# Patient Record
Sex: MALE | Race: WHITE | Employment: UNEMPLOYED | URBAN - METROPOLITAN AREA
[De-identification: names, ages, dates, MRNs, and addresses within clinical notes are randomized per-mention and may not be internally consistent; named-entity substitution may affect disease eponyms.]

---

## 2023-04-30 ENCOUNTER — HOSPITAL ENCOUNTER (INPATIENT)
Age: 83
LOS: 4 days | Discharge: NURSING, REHAB OR LONG-TERM CARE | End: 2023-05-04
Attending: EMERGENCY MEDICINE | Admitting: INTERNAL MEDICINE
Payer: MEDICARE

## 2023-04-30 ENCOUNTER — APPOINTMENT (OUTPATIENT)
Dept: GENERAL RADIOLOGY | Age: 83
End: 2023-04-30
Payer: MEDICARE

## 2023-04-30 DIAGNOSIS — J96.01 ACUTE RESPIRATORY FAILURE WITH HYPOXIA (HCC): Primary | ICD-10-CM

## 2023-04-30 DIAGNOSIS — I50.9 ACUTE ON CHRONIC CONGESTIVE HEART FAILURE, UNSPECIFIED HEART FAILURE TYPE (HCC): ICD-10-CM

## 2023-04-30 PROBLEM — J02.9 PHARYNGITIS: Status: ACTIVE | Noted: 2023-04-30

## 2023-04-30 PROBLEM — C44.91 SKIN CANCER, BASAL CELL: Status: ACTIVE | Noted: 2023-04-30

## 2023-04-30 PROBLEM — R26.2 DIFFICULTY WALKING: Status: ACTIVE | Noted: 2023-04-30

## 2023-04-30 PROBLEM — I42.0 CONGESTIVE CARDIOMYOPATHY (HCC): Status: ACTIVE | Noted: 2023-04-30

## 2023-04-30 PROBLEM — E78.5 HYPERLIPIDEMIA: Status: ACTIVE | Noted: 2023-04-30

## 2023-04-30 PROBLEM — I34.0 MITRAL VALVE INSUFFICIENCY: Status: ACTIVE | Noted: 2023-04-30

## 2023-04-30 PROBLEM — I47.29 PAROXYSMAL VENTRICULAR TACHYCARDIA (HCC): Status: ACTIVE | Noted: 2023-04-30

## 2023-04-30 PROBLEM — I50.32 CHRONIC DIASTOLIC (CONGESTIVE) HEART FAILURE (HCC): Status: ACTIVE | Noted: 2023-04-30

## 2023-04-30 PROBLEM — I49.5 SINOATRIAL NODE DYSFUNCTION (HCC): Status: ACTIVE | Noted: 2023-04-30

## 2023-04-30 PROBLEM — E66.09 NON MORBID OBESITY DUE TO EXCESS CALORIES: Status: ACTIVE | Noted: 2023-04-30

## 2023-04-30 PROBLEM — R77.8 ELEVATED TROPONIN I LEVEL: Status: ACTIVE | Noted: 2023-04-30

## 2023-04-30 PROBLEM — I50.33 ACUTE ON CHRONIC DIASTOLIC (CONGESTIVE) HEART FAILURE (HCC): Status: ACTIVE | Noted: 2023-04-30

## 2023-04-30 PROBLEM — R05.8 NON-PRODUCTIVE COUGH: Status: ACTIVE | Noted: 2023-04-30

## 2023-04-30 PROBLEM — R42 VERTIGO: Status: ACTIVE | Noted: 2023-04-30

## 2023-04-30 PROBLEM — I45.9 HEART BLOC: Status: ACTIVE | Noted: 2023-04-30

## 2023-04-30 PROBLEM — Z95.810 AUTOMATIC IMPLANTABLE CARDIOVERTER-DEFIBRILLATOR IN SITU: Status: ACTIVE | Noted: 2023-04-30

## 2023-04-30 PROBLEM — I10 ESSENTIAL HYPERTENSION: Status: ACTIVE | Noted: 2023-04-30

## 2023-04-30 PROBLEM — R79.89 ELEVATED TROPONIN I LEVEL: Status: ACTIVE | Noted: 2023-04-30

## 2023-04-30 LAB
ALBUMIN SERPL-MCNC: 3.9 G/DL (ref 3.4–5)
ALBUMIN/GLOB SERPL: 1.1 {RATIO} (ref 1.1–2.2)
ALP SERPL-CCNC: 69 U/L (ref 40–129)
ALT SERPL-CCNC: 15 U/L (ref 10–40)
ANION GAP SERPL CALCULATED.3IONS-SCNC: 9 MMOL/L (ref 3–16)
AST SERPL-CCNC: 19 U/L (ref 15–37)
BASOPHILS # BLD: 0 K/UL (ref 0–0.2)
BASOPHILS NFR BLD: 0.2 %
BILIRUB SERPL-MCNC: 1.1 MG/DL (ref 0–1)
BUN SERPL-MCNC: 19 MG/DL (ref 7–20)
CALCIUM SERPL-MCNC: 9.7 MG/DL (ref 8.3–10.6)
CHLORIDE SERPL-SCNC: 100 MMOL/L (ref 99–110)
CO2 SERPL-SCNC: 27 MMOL/L (ref 21–32)
CREAT SERPL-MCNC: 1.1 MG/DL (ref 0.8–1.3)
DEPRECATED RDW RBC AUTO: 14.2 % (ref 12.4–15.4)
EOSINOPHIL # BLD: 0.1 K/UL (ref 0–0.6)
EOSINOPHIL NFR BLD: 0.7 %
GFR SERPLBLD CREATININE-BSD FMLA CKD-EPI: >60 ML/MIN/{1.73_M2}
GLUCOSE SERPL-MCNC: 110 MG/DL (ref 70–99)
HCT VFR BLD AUTO: 43 % (ref 40.5–52.5)
HGB BLD-MCNC: 14.1 G/DL (ref 13.5–17.5)
LACTATE BLDV-SCNC: 1.3 MMOL/L (ref 0.4–1.9)
LYMPHOCYTES # BLD: 1.2 K/UL (ref 1–5.1)
LYMPHOCYTES NFR BLD: 9.3 %
MCH RBC QN AUTO: 30.5 PG (ref 26–34)
MCHC RBC AUTO-ENTMCNC: 32.8 G/DL (ref 31–36)
MCV RBC AUTO: 92.8 FL (ref 80–100)
MONOCYTES # BLD: 1.4 K/UL (ref 0–1.3)
MONOCYTES NFR BLD: 11.2 %
NEUTROPHILS # BLD: 9.7 K/UL (ref 1.7–7.7)
NEUTROPHILS NFR BLD: 78.6 %
NT-PROBNP SERPL-MCNC: 828 PG/ML (ref 0–449)
PLATELET # BLD AUTO: 174 K/UL (ref 135–450)
PMV BLD AUTO: 8.7 FL (ref 5–10.5)
POTASSIUM SERPL-SCNC: 3.8 MMOL/L (ref 3.5–5.1)
PROCALCITONIN SERPL IA-MCNC: 0.64 NG/ML (ref 0–0.15)
PROT SERPL-MCNC: 7.3 G/DL (ref 6.4–8.2)
RBC # BLD AUTO: 4.63 M/UL (ref 4.2–5.9)
SODIUM SERPL-SCNC: 136 MMOL/L (ref 136–145)
TROPONIN, HIGH SENSITIVITY: 23 NG/L (ref 0–22)
TROPONIN, HIGH SENSITIVITY: 27 NG/L (ref 0–22)
WBC # BLD AUTO: 12.4 K/UL (ref 4–11)

## 2023-04-30 PROCEDURE — 83605 ASSAY OF LACTIC ACID: CPT

## 2023-04-30 PROCEDURE — 6370000000 HC RX 637 (ALT 250 FOR IP): Performed by: PHYSICIAN ASSISTANT

## 2023-04-30 PROCEDURE — 6370000000 HC RX 637 (ALT 250 FOR IP): Performed by: INTERNAL MEDICINE

## 2023-04-30 PROCEDURE — 2580000003 HC RX 258: Performed by: INTERNAL MEDICINE

## 2023-04-30 PROCEDURE — 99285 EMERGENCY DEPT VISIT HI MDM: CPT

## 2023-04-30 PROCEDURE — 6360000002 HC RX W HCPCS: Performed by: PHYSICIAN ASSISTANT

## 2023-04-30 PROCEDURE — 93005 ELECTROCARDIOGRAM TRACING: CPT | Performed by: PHYSICIAN ASSISTANT

## 2023-04-30 PROCEDURE — 96374 THER/PROPH/DIAG INJ IV PUSH: CPT

## 2023-04-30 PROCEDURE — 83880 ASSAY OF NATRIURETIC PEPTIDE: CPT

## 2023-04-30 PROCEDURE — 1200000000 HC SEMI PRIVATE

## 2023-04-30 PROCEDURE — 80053 COMPREHEN METABOLIC PANEL: CPT

## 2023-04-30 PROCEDURE — 71045 X-RAY EXAM CHEST 1 VIEW: CPT

## 2023-04-30 PROCEDURE — 85025 COMPLETE CBC W/AUTO DIFF WBC: CPT

## 2023-04-30 PROCEDURE — 36415 COLL VENOUS BLD VENIPUNCTURE: CPT

## 2023-04-30 PROCEDURE — 84145 PROCALCITONIN (PCT): CPT

## 2023-04-30 PROCEDURE — 84484 ASSAY OF TROPONIN QUANT: CPT

## 2023-04-30 RX ORDER — DORZOLAMIDE HYDROCHLORIDE AND TIMOLOL MALEATE 20; 5 MG/ML; MG/ML
1 SOLUTION/ DROPS OPHTHALMIC 2 TIMES DAILY
COMMUNITY

## 2023-04-30 RX ORDER — POLYETHYLENE GLYCOL 3350 17 G/17G
17 POWDER, FOR SOLUTION ORAL DAILY PRN
Status: DISCONTINUED | OUTPATIENT
Start: 2023-04-30 | End: 2023-05-04 | Stop reason: HOSPADM

## 2023-04-30 RX ORDER — SODIUM CHLORIDE 0.9 % (FLUSH) 0.9 %
10 SYRINGE (ML) INJECTION EVERY 12 HOURS SCHEDULED
Status: DISCONTINUED | OUTPATIENT
Start: 2023-04-30 | End: 2023-05-04 | Stop reason: HOSPADM

## 2023-04-30 RX ORDER — FUROSEMIDE 10 MG/ML
40 INJECTION INTRAMUSCULAR; INTRAVENOUS ONCE
Status: COMPLETED | OUTPATIENT
Start: 2023-04-30 | End: 2023-04-30

## 2023-04-30 RX ORDER — BRIMONIDINE TARTRATE 2 MG/ML
1 SOLUTION/ DROPS OPHTHALMIC DAILY
Status: DISCONTINUED | OUTPATIENT
Start: 2023-05-01 | End: 2023-05-01

## 2023-04-30 RX ORDER — FUROSEMIDE 10 MG/ML
40 INJECTION INTRAMUSCULAR; INTRAVENOUS 2 TIMES DAILY
Status: DISCONTINUED | OUTPATIENT
Start: 2023-05-01 | End: 2023-05-02

## 2023-04-30 RX ORDER — SODIUM CHLORIDE 0.9 % (FLUSH) 0.9 %
10 SYRINGE (ML) INJECTION PRN
Status: DISCONTINUED | OUTPATIENT
Start: 2023-04-30 | End: 2023-05-04 | Stop reason: HOSPADM

## 2023-04-30 RX ORDER — VALSARTAN 80 MG/1
160 TABLET ORAL DAILY
Status: DISCONTINUED | OUTPATIENT
Start: 2023-05-01 | End: 2023-05-01

## 2023-04-30 RX ORDER — ENOXAPARIN SODIUM 100 MG/ML
40 INJECTION SUBCUTANEOUS DAILY
Status: DISCONTINUED | OUTPATIENT
Start: 2023-05-01 | End: 2023-05-02

## 2023-04-30 RX ORDER — LATANOPROST 50 UG/ML
1 SOLUTION/ DROPS OPHTHALMIC NIGHTLY
Status: DISCONTINUED | OUTPATIENT
Start: 2023-05-01 | End: 2023-05-04 | Stop reason: HOSPADM

## 2023-04-30 RX ORDER — ONDANSETRON 2 MG/ML
4 INJECTION INTRAMUSCULAR; INTRAVENOUS EVERY 4 HOURS PRN
Status: DISCONTINUED | OUTPATIENT
Start: 2023-04-30 | End: 2023-05-04 | Stop reason: HOSPADM

## 2023-04-30 RX ORDER — LOSARTAN POTASSIUM 50 MG/1
50 TABLET ORAL DAILY
COMMUNITY

## 2023-04-30 RX ORDER — CARVEDILOL 6.25 MG/1
6.25 TABLET ORAL 2 TIMES DAILY WITH MEALS
Status: DISCONTINUED | OUTPATIENT
Start: 2023-05-01 | End: 2023-05-04 | Stop reason: HOSPADM

## 2023-04-30 RX ORDER — BENZONATATE 200 MG/1
200 CAPSULE ORAL 3 TIMES DAILY PRN
Status: DISCONTINUED | OUTPATIENT
Start: 2023-04-30 | End: 2023-05-04 | Stop reason: HOSPADM

## 2023-04-30 RX ORDER — ASPIRIN 81 MG/1
81 TABLET ORAL DAILY
COMMUNITY

## 2023-04-30 RX ORDER — ACETAMINOPHEN 325 MG/1
650 TABLET ORAL EVERY 4 HOURS PRN
Status: DISCONTINUED | OUTPATIENT
Start: 2023-04-30 | End: 2023-05-04 | Stop reason: HOSPADM

## 2023-04-30 RX ORDER — ACETAMINOPHEN 650 MG/1
650 SUPPOSITORY RECTAL EVERY 4 HOURS PRN
Status: DISCONTINUED | OUTPATIENT
Start: 2023-04-30 | End: 2023-05-04 | Stop reason: HOSPADM

## 2023-04-30 RX ORDER — MAGNESIUM HYDROXIDE/ALUMINUM HYDROXICE/SIMETHICONE 120; 1200; 1200 MG/30ML; MG/30ML; MG/30ML
30 SUSPENSION ORAL ONCE
Status: COMPLETED | OUTPATIENT
Start: 2023-04-30 | End: 2023-04-30

## 2023-04-30 RX ORDER — SODIUM CHLORIDE 9 MG/ML
INJECTION, SOLUTION INTRAVENOUS PRN
Status: DISCONTINUED | OUTPATIENT
Start: 2023-04-30 | End: 2023-05-04 | Stop reason: HOSPADM

## 2023-04-30 RX ADMIN — ALUMINUM HYDROXIDE, MAGNESIUM HYDROXIDE, AND SIMETHICONE 30 ML: 200; 200; 20 SUSPENSION ORAL at 18:58

## 2023-04-30 RX ADMIN — SODIUM CHLORIDE, PRESERVATIVE FREE 10 ML: 5 INJECTION INTRAVENOUS at 23:16

## 2023-04-30 RX ADMIN — FUROSEMIDE 40 MG: 10 INJECTION, SOLUTION INTRAMUSCULAR; INTRAVENOUS at 18:58

## 2023-04-30 RX ADMIN — BENZONATATE 200 MG: 100 CAPSULE ORAL at 23:51

## 2023-04-30 ASSESSMENT — PAIN SCALES - GENERAL
PAINLEVEL_OUTOF10: 5
PAINLEVEL_OUTOF10: 6

## 2023-04-30 ASSESSMENT — PAIN DESCRIPTION - LOCATION
LOCATION: ABDOMEN
LOCATION: THROAT

## 2023-04-30 ASSESSMENT — PAIN DESCRIPTION - DESCRIPTORS: DESCRIPTORS: BURNING

## 2023-04-30 ASSESSMENT — PAIN DESCRIPTION - PAIN TYPE: TYPE: ACUTE PAIN

## 2023-04-30 ASSESSMENT — PAIN DESCRIPTION - FREQUENCY: FREQUENCY: CONTINUOUS

## 2023-04-30 ASSESSMENT — PAIN - FUNCTIONAL ASSESSMENT
PAIN_FUNCTIONAL_ASSESSMENT: PREVENTS OR INTERFERES SOME ACTIVE ACTIVITIES AND ADLS
PAIN_FUNCTIONAL_ASSESSMENT: 0-10

## 2023-05-01 ENCOUNTER — APPOINTMENT (OUTPATIENT)
Dept: CT IMAGING | Age: 83
End: 2023-05-01
Payer: MEDICARE

## 2023-05-01 LAB
ANION GAP SERPL CALCULATED.3IONS-SCNC: 7 MMOL/L (ref 3–16)
BASE EXCESS BLDA CALC-SCNC: 2.8 MMOL/L (ref -3–3)
BASOPHILS # BLD: 0 K/UL (ref 0–0.2)
BASOPHILS NFR BLD: 0.2 %
BUN SERPL-MCNC: 22 MG/DL (ref 7–20)
CALCIUM SERPL-MCNC: 9.6 MG/DL (ref 8.3–10.6)
CHLORIDE SERPL-SCNC: 102 MMOL/L (ref 99–110)
CO2 BLDA-SCNC: 30.1 MMOL/L
CO2 SERPL-SCNC: 31 MMOL/L (ref 21–32)
COHGB MFR BLDA: 1.7 % (ref 0–1.5)
CREAT SERPL-MCNC: 1.2 MG/DL (ref 0.8–1.3)
DEPRECATED RDW RBC AUTO: 14.1 % (ref 12.4–15.4)
EKG ATRIAL RATE: 63 BPM
EKG DIAGNOSIS: NORMAL
EKG P AXIS: 108 DEGREES
EKG P-R INTERVAL: 158 MS
EKG Q-T INTERVAL: 448 MS
EKG QRS DURATION: 140 MS
EKG QTC CALCULATION (BAZETT): 458 MS
EKG R AXIS: 223 DEGREES
EKG T AXIS: 69 DEGREES
EKG VENTRICULAR RATE: 63 BPM
EOSINOPHIL # BLD: 0 K/UL (ref 0–0.6)
EOSINOPHIL NFR BLD: 0.2 %
GFR SERPLBLD CREATININE-BSD FMLA CKD-EPI: 59 ML/MIN/{1.73_M2}
GLUCOSE SERPL-MCNC: 149 MG/DL (ref 70–99)
HCO3 BLDA-SCNC: 28.7 MMOL/L (ref 21–29)
HCT VFR BLD AUTO: 42.5 % (ref 40.5–52.5)
HGB BLD-MCNC: 13.8 G/DL (ref 13.5–17.5)
HGB BLDA-MCNC: 14.5 G/DL (ref 13.5–17.5)
LV EF: 58 %
LVEF MODALITY: NORMAL
LYMPHOCYTES # BLD: 0.7 K/UL (ref 1–5.1)
LYMPHOCYTES NFR BLD: 6.7 %
MCH RBC QN AUTO: 30.2 PG (ref 26–34)
MCHC RBC AUTO-ENTMCNC: 32.6 G/DL (ref 31–36)
MCV RBC AUTO: 92.5 FL (ref 80–100)
METHGB MFR BLDA: 0.4 %
MONOCYTES # BLD: 1.3 K/UL (ref 0–1.3)
MONOCYTES NFR BLD: 11.8 %
NEUTROPHILS # BLD: 8.9 K/UL (ref 1.7–7.7)
NEUTROPHILS NFR BLD: 81.1 %
O2 THERAPY: ABNORMAL
PCO2 BLDA: 47.6 MMHG (ref 35–45)
PH BLDA: 7.39 [PH] (ref 7.35–7.45)
PLATELET # BLD AUTO: 170 K/UL (ref 135–450)
PMV BLD AUTO: 8.6 FL (ref 5–10.5)
PO2 BLDA: 70.7 MMHG (ref 75–108)
POTASSIUM SERPL-SCNC: 3.8 MMOL/L (ref 3.5–5.1)
RBC # BLD AUTO: 4.59 M/UL (ref 4.2–5.9)
SAO2 % BLDA: 95.8 %
SODIUM SERPL-SCNC: 140 MMOL/L (ref 136–145)
TROPONIN, HIGH SENSITIVITY: 28 NG/L (ref 0–22)
WBC # BLD AUTO: 11 K/UL (ref 4–11)

## 2023-05-01 PROCEDURE — 99223 1ST HOSP IP/OBS HIGH 75: CPT | Performed by: INTERNAL MEDICINE

## 2023-05-01 PROCEDURE — 82803 BLOOD GASES ANY COMBINATION: CPT

## 2023-05-01 PROCEDURE — 97530 THERAPEUTIC ACTIVITIES: CPT

## 2023-05-01 PROCEDURE — 6360000002 HC RX W HCPCS: Performed by: INTERNAL MEDICINE

## 2023-05-01 PROCEDURE — 84484 ASSAY OF TROPONIN QUANT: CPT

## 2023-05-01 PROCEDURE — 97162 PT EVAL MOD COMPLEX 30 MIN: CPT

## 2023-05-01 PROCEDURE — 2580000003 HC RX 258: Performed by: INTERNAL MEDICINE

## 2023-05-01 PROCEDURE — 6360000004 HC RX CONTRAST MEDICATION: Performed by: INTERNAL MEDICINE

## 2023-05-01 PROCEDURE — 92610 EVALUATE SWALLOWING FUNCTION: CPT

## 2023-05-01 PROCEDURE — 71260 CT THORAX DX C+: CPT

## 2023-05-01 PROCEDURE — 6370000000 HC RX 637 (ALT 250 FOR IP): Performed by: INTERNAL MEDICINE

## 2023-05-01 PROCEDURE — 97116 GAIT TRAINING THERAPY: CPT

## 2023-05-01 PROCEDURE — 80048 BASIC METABOLIC PNL TOTAL CA: CPT

## 2023-05-01 PROCEDURE — 97166 OT EVAL MOD COMPLEX 45 MIN: CPT

## 2023-05-01 PROCEDURE — 2700000000 HC OXYGEN THERAPY PER DAY

## 2023-05-01 PROCEDURE — 31720 CLEARANCE OF AIRWAYS: CPT

## 2023-05-01 PROCEDURE — C8929 TTE W OR WO FOL WCON,DOPPLER: HCPCS

## 2023-05-01 PROCEDURE — 94640 AIRWAY INHALATION TREATMENT: CPT

## 2023-05-01 PROCEDURE — 85025 COMPLETE CBC W/AUTO DIFF WBC: CPT

## 2023-05-01 PROCEDURE — 1200000000 HC SEMI PRIVATE

## 2023-05-01 PROCEDURE — 94761 N-INVAS EAR/PLS OXIMETRY MLT: CPT

## 2023-05-01 PROCEDURE — 36600 WITHDRAWAL OF ARTERIAL BLOOD: CPT

## 2023-05-01 PROCEDURE — 93010 ELECTROCARDIOGRAM REPORT: CPT | Performed by: INTERNAL MEDICINE

## 2023-05-01 RX ORDER — ALBUTEROL SULFATE 2.5 MG/3ML
2.5 SOLUTION RESPIRATORY (INHALATION) EVERY 4 HOURS PRN
Status: DISCONTINUED | OUTPATIENT
Start: 2023-05-01 | End: 2023-05-04 | Stop reason: HOSPADM

## 2023-05-01 RX ORDER — DORZOLAMIDE HYDROCHLORIDE AND TIMOLOL MALEATE 20; 5 MG/ML; MG/ML
1 SOLUTION/ DROPS OPHTHALMIC 2 TIMES DAILY
Status: DISCONTINUED | OUTPATIENT
Start: 2023-05-01 | End: 2023-05-04 | Stop reason: HOSPADM

## 2023-05-01 RX ORDER — MECLIZINE HCL 12.5 MG/1
12.5 TABLET ORAL 3 TIMES DAILY PRN
COMMUNITY

## 2023-05-01 RX ORDER — LOSARTAN POTASSIUM 50 MG/1
50 TABLET ORAL DAILY
Status: DISCONTINUED | OUTPATIENT
Start: 2023-05-01 | End: 2023-05-04 | Stop reason: HOSPADM

## 2023-05-01 RX ORDER — ASPIRIN 81 MG/1
81 TABLET ORAL DAILY
Status: DISCONTINUED | OUTPATIENT
Start: 2023-05-01 | End: 2023-05-04 | Stop reason: HOSPADM

## 2023-05-01 RX ORDER — SILDENAFIL CITRATE 20 MG/1
20 TABLET ORAL PRN
COMMUNITY

## 2023-05-01 RX ADMIN — FUROSEMIDE 40 MG: 10 INJECTION, SOLUTION INTRAMUSCULAR; INTRAVENOUS at 17:48

## 2023-05-01 RX ADMIN — DORZOLAMIDE HYDROCHLORIDE AND TIMOLOL MALEATE 1 DROP: 20; 5 SOLUTION/ DROPS OPHTHALMIC at 10:10

## 2023-05-01 RX ADMIN — PIPERACILLIN AND TAZOBACTAM 3375 MG: 3; .375 INJECTION, POWDER, LYOPHILIZED, FOR SOLUTION INTRAVENOUS at 17:48

## 2023-05-01 RX ADMIN — LATANOPROST 1 DROP: 50 SOLUTION OPHTHALMIC at 01:20

## 2023-05-01 RX ADMIN — PIPERACILLIN AND TAZOBACTAM 3375 MG: 3; .375 INJECTION, POWDER, LYOPHILIZED, FOR SOLUTION INTRAVENOUS at 10:07

## 2023-05-01 RX ADMIN — FUROSEMIDE 40 MG: 10 INJECTION, SOLUTION INTRAMUSCULAR; INTRAVENOUS at 10:02

## 2023-05-01 RX ADMIN — ALBUTEROL SULFATE 2.5 MG: 2.5 SOLUTION RESPIRATORY (INHALATION) at 09:42

## 2023-05-01 RX ADMIN — CARVEDILOL 6.25 MG: 6.25 TABLET, FILM COATED ORAL at 17:49

## 2023-05-01 RX ADMIN — LOSARTAN POTASSIUM 50 MG: 50 TABLET, FILM COATED ORAL at 10:09

## 2023-05-01 RX ADMIN — IOPAMIDOL 75 ML: 755 INJECTION, SOLUTION INTRAVENOUS at 13:09

## 2023-05-01 RX ADMIN — ASPIRIN 81 MG: 81 TABLET, COATED ORAL at 10:09

## 2023-05-01 RX ADMIN — Medication 1 LOZENGE: at 14:55

## 2023-05-01 RX ADMIN — DORZOLAMIDE HYDROCHLORIDE AND TIMOLOL MALEATE 1 DROP: 20; 5 SOLUTION/ DROPS OPHTHALMIC at 01:23

## 2023-05-01 RX ADMIN — SODIUM CHLORIDE, PRESERVATIVE FREE 10 ML: 5 INJECTION INTRAVENOUS at 10:11

## 2023-05-01 RX ADMIN — SERTRALINE 50 MG: 50 TABLET, FILM COATED ORAL at 10:09

## 2023-05-01 RX ADMIN — ENOXAPARIN SODIUM 40 MG: 100 INJECTION SUBCUTANEOUS at 10:10

## 2023-05-01 RX ADMIN — PERFLUTREN 1.5 ML: 6.52 INJECTION, SUSPENSION INTRAVENOUS at 12:55

## 2023-05-01 RX ADMIN — CARVEDILOL 6.25 MG: 6.25 TABLET, FILM COATED ORAL at 10:09

## 2023-05-01 NOTE — DISCHARGE INSTRUCTIONS
Extra Heart Failure sites:     https://[employer].com/   --- this is American Heart Association interactive Healthier Living with Heart Failure guidebook. Please click hyperlink or copy / paste link into search bar. Use your mouse to scroll through the pages. Lots of information about weight monitoring, diet tips, activity, meds, etc    HF [place] cyndy  -- this is a free smart phone cyndy available for iPhone and Android download. Use your phone to track sodium / fluid intake, zone tool symptom tracking, weights, medications, etc. Click on this hyperlink  HF [place] [employer]   for QR code for easy download. DASH (Dietary Approach to Stop Hypertension) diet --  [employer] -- this diet is a flexible eating plan that promotes heart healthy eating style. Click on hyperlink or copy / paste link into search bar. Lots of low sodium recipes and tips.     [email or web address]  -- more free recipes

## 2023-05-01 NOTE — H&P
Hospital Medicine  History and Physical    PCP: Caroline Landeros MD  Patient Name: Olga Guillen    Date of Service: Pt seen/examined on 23 and admitted to Inpatient with expected LOS greater than two midnights due to medical therapy. CHIEF COMPLAINT:  Pt c/o sore throat, non productive cough, increased lower extremity edema, shortness of breath and difficulty walking  HISTORY OF PRESENT ILLNESS: Pt is an 78y.o. 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    Non morbid obesity due to excess calories    Essential hypertension    Heart block    Vertigo    Skin cancer, basal cell (on head) - NBCC with focal metatypical change -SCALP 2011, NBCC, pigmented - R temple 13     Congestive cardiomyopathy ([address]    H/o Sinoatrial node dysfunction (HCC)    H/o Paroxysmal ventricular tachycardia ([address]    Mitral valve insufficiency    Hyperlipidemia    He presents to the emergency room for evaluation following a 3-4 day h/o increasing lower extremity edema, shortness of breath (with non productive cough), a sore throat and difficulty walking. He reports that he drank a big amount of apple cider vinegar and feels he may have burned his throat. His shortness of breath has been moderately severe, worse with exertion and improved with rest.  He states that his symptoms began after he went to a  for a friend. At the , he ate, \"a lot\" of Srinivas[employer]'s food including pizza, lasagna etc.  His daughter, at bedside, reports that he eats out almost every meal and she is concerned that his meals are high sodium. The patient states that he puts a lot of salt on most things that he eats. On evaluation in the emergency room he became hypoxic into the 80's when walking. He was found to have acute on chronic diastolic congestive heart failure along with acute hypoxic respiratory failure.   His

## 2023-05-01 NOTE — DISCHARGE INSTR - COC
Continuity of Care Form    Patient Name: Rosalia Novak   :  1940  MRN:  [id number]    Admit date:  2023  Discharge date:  2023    Code Status Order: Full Code   Advance Directives:     Admitting Physician:  Verner Patter, MD  PCP: Aleksey Post MD    Discharging Nurse: [facility] Unit/Room#: [address] Unit Phone Number: [phone]    Emergency Contact:   Extended Emergency Contact Information  Primary Emergency Contact: Josh Green  Address: [address] [place] [address] Phone: [phone]  Relation: Spouse    Past Surgical History:  Past Surgical History:   Procedure Laterality Date    [address]      for kidney stones    OTHER SURGICAL HISTORY      excision scalp basil cell    [address]  2013    with defibrillator    PRE-MALIGNANT / BENIGN SKIN LESION EXCISION         Immunization History: There is no immunization history on file for this patient.     Active Problems:  Patient Active Problem List   Diagnosis Code    Acute on chronic diastolic (congestive) heart failure (HCC) I50.33    S/P ICD (internal cardiac defibrillator) procedure Z95.810    Non morbid obesity due to excess calories E66.09    Primary hypertension I10    Heart block I45.9    Vertigo R42    Skin cancer, basal cell (on head) - NBCC with focal metatypical change -SCALP 2011, NBCC, pigmented - R temple 13  C44.91    Congestive cardiomyopathy (HCC) I42.0    H/o Sinoatrial node dysfunction (HCC) I49.5    H/o Paroxysmal ventricular tachycardia (HCC) I47.29    Mitral valve insufficiency I34.0    Hyperlipidemia E78.5    Acute respiratory failure with hypoxia (HCC) J96.01    Elevated troponin I level R77.8    Difficulty walking R26.2    Pharyngitis J02.9    Non-productive cough R05.8    Chronic diastolic (congestive) heart failure (HCC) I50.32

## 2023-05-02 LAB
ANION GAP SERPL CALCULATED.3IONS-SCNC: 12 MMOL/L (ref 3–16)
BASOPHILS # BLD: 0 K/UL (ref 0–0.2)
BASOPHILS NFR BLD: 0.4 %
BUN SERPL-MCNC: 46 MG/DL (ref 7–20)
CALCIUM SERPL-MCNC: 9.5 MG/DL (ref 8.3–10.6)
CHLORIDE SERPL-SCNC: 103 MMOL/L (ref 99–110)
CO2 SERPL-SCNC: 28 MMOL/L (ref 21–32)
CREAT SERPL-MCNC: 2.4 MG/DL (ref 0.8–1.3)
DEPRECATED RDW RBC AUTO: 13.9 % (ref 12.4–15.4)
EOSINOPHIL # BLD: 0.1 K/UL (ref 0–0.6)
EOSINOPHIL NFR BLD: 1.8 %
GFR SERPLBLD CREATININE-BSD FMLA CKD-EPI: 26 ML/MIN/{1.73_M2}
GLUCOSE SERPL-MCNC: 130 MG/DL (ref 70–99)
HCT VFR BLD AUTO: 39.2 % (ref 40.5–52.5)
HGB BLD-MCNC: 13.2 G/DL (ref 13.5–17.5)
LYMPHOCYTES # BLD: 1 K/UL (ref 1–5.1)
LYMPHOCYTES NFR BLD: 14.8 %
MCH RBC QN AUTO: 31.4 PG (ref 26–34)
MCHC RBC AUTO-ENTMCNC: 33.8 G/DL (ref 31–36)
MCV RBC AUTO: 93.1 FL (ref 80–100)
MONOCYTES # BLD: 0.9 K/UL (ref 0–1.3)
MONOCYTES NFR BLD: 12.8 %
NEUTROPHILS # BLD: 4.8 K/UL (ref 1.7–7.7)
NEUTROPHILS NFR BLD: 70.2 %
PLATELET # BLD AUTO: 175 K/UL (ref 135–450)
PMV BLD AUTO: 9.3 FL (ref 5–10.5)
POTASSIUM SERPL-SCNC: 3.6 MMOL/L (ref 3.5–5.1)
RBC # BLD AUTO: 4.21 M/UL (ref 4.2–5.9)
SODIUM SERPL-SCNC: 143 MMOL/L (ref 136–145)
WBC # BLD AUTO: 6.8 K/UL (ref 4–11)

## 2023-05-02 PROCEDURE — 97530 THERAPEUTIC ACTIVITIES: CPT

## 2023-05-02 PROCEDURE — 99232 SBSQ HOSP IP/OBS MODERATE 35: CPT | Performed by: NURSE PRACTITIONER

## 2023-05-02 PROCEDURE — 6360000002 HC RX W HCPCS: Performed by: STUDENT IN AN ORGANIZED HEALTH CARE EDUCATION/TRAINING PROGRAM

## 2023-05-02 PROCEDURE — 2580000003 HC RX 258: Performed by: NURSE PRACTITIONER

## 2023-05-02 PROCEDURE — 2580000003 HC RX 258: Performed by: INTERNAL MEDICINE

## 2023-05-02 PROCEDURE — 2580000003 HC RX 258: Performed by: STUDENT IN AN ORGANIZED HEALTH CARE EDUCATION/TRAINING PROGRAM

## 2023-05-02 PROCEDURE — 94761 N-INVAS EAR/PLS OXIMETRY MLT: CPT

## 2023-05-02 PROCEDURE — 6360000002 HC RX W HCPCS: Performed by: INTERNAL MEDICINE

## 2023-05-02 PROCEDURE — 6370000000 HC RX 637 (ALT 250 FOR IP): Performed by: INTERNAL MEDICINE

## 2023-05-02 PROCEDURE — 92526 ORAL FUNCTION THERAPY: CPT

## 2023-05-02 PROCEDURE — 1200000000 HC SEMI PRIVATE

## 2023-05-02 PROCEDURE — 36415 COLL VENOUS BLD VENIPUNCTURE: CPT

## 2023-05-02 PROCEDURE — 97116 GAIT TRAINING THERAPY: CPT

## 2023-05-02 PROCEDURE — 2700000000 HC OXYGEN THERAPY PER DAY

## 2023-05-02 PROCEDURE — 80048 BASIC METABOLIC PNL TOTAL CA: CPT

## 2023-05-02 PROCEDURE — 85025 COMPLETE CBC W/AUTO DIFF WBC: CPT

## 2023-05-02 RX ORDER — ENOXAPARIN SODIUM 100 MG/ML
30 INJECTION SUBCUTANEOUS DAILY
Status: DISCONTINUED | OUTPATIENT
Start: 2023-05-03 | End: 2023-05-04 | Stop reason: HOSPADM

## 2023-05-02 RX ORDER — SODIUM CHLORIDE 9 MG/ML
INJECTION, SOLUTION INTRAVENOUS CONTINUOUS
Status: ACTIVE | OUTPATIENT
Start: 2023-05-02 | End: 2023-05-02

## 2023-05-02 RX ADMIN — CARVEDILOL 6.25 MG: 6.25 TABLET, FILM COATED ORAL at 17:45

## 2023-05-02 RX ADMIN — Medication 10 ML: at 10:59

## 2023-05-02 RX ADMIN — PIPERACILLIN AND TAZOBACTAM 3375 MG: 3; .375 INJECTION, POWDER, LYOPHILIZED, FOR SOLUTION INTRAVENOUS at 01:09

## 2023-05-02 RX ADMIN — DORZOLAMIDE HYDROCHLORIDE AND TIMOLOL MALEATE 1 DROP: 20; 5 SOLUTION/ DROPS OPHTHALMIC at 08:12

## 2023-05-02 RX ADMIN — SODIUM CHLORIDE, PRESERVATIVE FREE 10 ML: 5 INJECTION INTRAVENOUS at 08:11

## 2023-05-02 RX ADMIN — PIPERACILLIN AND TAZOBACTAM 3375 MG: 3; .375 INJECTION, POWDER, LYOPHILIZED, FOR SOLUTION INTRAVENOUS at 21:15

## 2023-05-02 RX ADMIN — SODIUM CHLORIDE: 9 INJECTION, SOLUTION INTRAVENOUS at 11:19

## 2023-05-02 RX ADMIN — ASPIRIN 81 MG: 81 TABLET, COATED ORAL at 08:11

## 2023-05-02 RX ADMIN — DORZOLAMIDE HYDROCHLORIDE AND TIMOLOL MALEATE 1 DROP: 20; 5 SOLUTION/ DROPS OPHTHALMIC at 21:11

## 2023-05-02 RX ADMIN — SERTRALINE 50 MG: 50 TABLET, FILM COATED ORAL at 08:11

## 2023-05-02 RX ADMIN — LATANOPROST 1 DROP: 50 SOLUTION OPHTHALMIC at 21:12

## 2023-05-02 RX ADMIN — SODIUM CHLORIDE: 9 INJECTION, SOLUTION INTRAVENOUS at 10:59

## 2023-05-02 RX ADMIN — ACETAMINOPHEN 650 MG: 325 TABLET ORAL at 08:31

## 2023-05-02 RX ADMIN — LOSARTAN POTASSIUM 50 MG: 50 TABLET, FILM COATED ORAL at 08:11

## 2023-05-02 RX ADMIN — ENOXAPARIN SODIUM 40 MG: 100 INJECTION SUBCUTANEOUS at 08:12

## 2023-05-02 RX ADMIN — PIPERACILLIN AND TAZOBACTAM 3375 MG: 3; .375 INJECTION, POWDER, LYOPHILIZED, FOR SOLUTION INTRAVENOUS at 11:48

## 2023-05-02 RX ADMIN — FUROSEMIDE 40 MG: 10 INJECTION, SOLUTION INTRAMUSCULAR; INTRAVENOUS at 08:11

## 2023-05-02 RX ADMIN — CARVEDILOL 6.25 MG: 6.25 TABLET, FILM COATED ORAL at 08:11

## 2023-05-02 RX ADMIN — Medication 10 ML: at 08:12

## 2023-05-02 ASSESSMENT — PAIN - FUNCTIONAL ASSESSMENT: PAIN_FUNCTIONAL_ASSESSMENT: ACTIVITIES ARE NOT PREVENTED

## 2023-05-02 ASSESSMENT — PAIN DESCRIPTION - DESCRIPTORS: DESCRIPTORS: ACHING

## 2023-05-02 ASSESSMENT — PAIN DESCRIPTION - ORIENTATION: ORIENTATION: ANTERIOR

## 2023-05-02 ASSESSMENT — PAIN DESCRIPTION - LOCATION: LOCATION: HEAD

## 2023-05-02 ASSESSMENT — PAIN SCALES - GENERAL: PAINLEVEL_OUTOF10: 7

## 2023-05-02 NOTE — CARE COORDINATION
Case Management Assessment  Initial Evaluation    Date/Time of Evaluation: 5/2/2023 1:00 PM  Assessment Completed by: FILOMENA Hernandez    If patient is discharged prior to next notation, then this note serves as note for discharge by case management. Patient Name: Orestes Blunt                   YOB: 1940  Diagnosis: Acute respiratory failure with hypoxia (HCC) [J96.01]  Acute on chronic diastolic (congestive) heart failure ([address] [I50.33]  Acute on chronic congestive heart failure, unspecified heart failure type ([address] [I50.9]                   Date / Time: 4/30/2023  5:35 PM    Patient Admission Status: Inpatient   Readmission Risk (Low < 19, Mod (19-27), High > 27): Readmission Risk Score: 13.4    Current PCP: Carlene Pablo  PCP verified by CM? Yes    Chart Reviewed: Yes      History Provided by: Patient  Patient Orientation: Alert and Oriented    Patient Cognition: Short Term Memory Deficit    Hospitalization in the last 30 days (Readmission):  No    If yes, Readmission Assessment in CM Navigator will be completed. Advance Directives:      Code Status: Full Code   Patient's Primary Decision Maker is: Legal Next of Kin      Discharge Planning:    Patient lives with: Alone Type of Home: Trailer/Mobile Home  Primary Care Giver: Self  Patient Support Systems include: Children   Current Financial resources: None  Current community resources: ECF/Home Care  Current services prior to admission: None (cleaning person 1x/mo)            Current DME:              Type of Home Care services:  None    ADLS  Prior functional level: Housework  Current functional level: Cooking, Housework, Shopping, Mobility    PT AM-PAC: 16 /24  OT AM-PAC: 13 /24    Family can provide assistance at DC: Yes  Would you like Case Management to discuss the discharge plan with any other family members/significant others, and if so, who?  Yes (son or dtr)  Plans to Return to Present Housing: Unknown at present  Other

## 2023-05-03 PROBLEM — I50.33 ACUTE ON CHRONIC DIASTOLIC (CONGESTIVE) HEART FAILURE (HCC): Status: RESOLVED | Noted: 2023-04-30 | Resolved: 2023-05-03

## 2023-05-03 LAB
ANION GAP SERPL CALCULATED.3IONS-SCNC: 7 MMOL/L (ref 3–16)
BASOPHILS # BLD: 0.1 K/UL (ref 0–0.2)
BASOPHILS NFR BLD: 0.8 %
BUN SERPL-MCNC: 56 MG/DL (ref 7–20)
CALCIUM SERPL-MCNC: 9.1 MG/DL (ref 8.3–10.6)
CHLORIDE SERPL-SCNC: 101 MMOL/L (ref 99–110)
CO2 SERPL-SCNC: 30 MMOL/L (ref 21–32)
CREAT SERPL-MCNC: 2.4 MG/DL (ref 0.8–1.3)
DEPRECATED RDW RBC AUTO: 13.9 % (ref 12.4–15.4)
EOSINOPHIL # BLD: 0.3 K/UL (ref 0–0.6)
EOSINOPHIL NFR BLD: 5 %
GFR SERPLBLD CREATININE-BSD FMLA CKD-EPI: 26 ML/MIN/{1.73_M2}
GLUCOSE SERPL-MCNC: 130 MG/DL (ref 70–99)
HCT VFR BLD AUTO: 36.5 % (ref 40.5–52.5)
HGB BLD-MCNC: 12.6 G/DL (ref 13.5–17.5)
LYMPHOCYTES # BLD: 1.2 K/UL (ref 1–5.1)
LYMPHOCYTES NFR BLD: 17.7 %
MAGNESIUM SERPL-MCNC: 2.1 MG/DL (ref 1.8–2.4)
MCH RBC QN AUTO: 32 PG (ref 26–34)
MCHC RBC AUTO-ENTMCNC: 34.4 G/DL (ref 31–36)
MCV RBC AUTO: 93.1 FL (ref 80–100)
MONOCYTES # BLD: 0.7 K/UL (ref 0–1.3)
MONOCYTES NFR BLD: 10.6 %
NEUTROPHILS # BLD: 4.5 K/UL (ref 1.7–7.7)
NEUTROPHILS NFR BLD: 65.9 %
PLATELET # BLD AUTO: 230 K/UL (ref 135–450)
PMV BLD AUTO: 9.8 FL (ref 5–10.5)
POTASSIUM SERPL-SCNC: 3.5 MMOL/L (ref 3.5–5.1)
RBC # BLD AUTO: 3.92 M/UL (ref 4.2–5.9)
SODIUM SERPL-SCNC: 138 MMOL/L (ref 136–145)
WBC # BLD AUTO: 6.8 K/UL (ref 4–11)

## 2023-05-03 PROCEDURE — 97535 SELF CARE MNGMENT TRAINING: CPT

## 2023-05-03 PROCEDURE — 83735 ASSAY OF MAGNESIUM: CPT

## 2023-05-03 PROCEDURE — 97530 THERAPEUTIC ACTIVITIES: CPT

## 2023-05-03 PROCEDURE — 94760 N-INVAS EAR/PLS OXIMETRY 1: CPT

## 2023-05-03 PROCEDURE — 6370000000 HC RX 637 (ALT 250 FOR IP): Performed by: STUDENT IN AN ORGANIZED HEALTH CARE EDUCATION/TRAINING PROGRAM

## 2023-05-03 PROCEDURE — 92526 ORAL FUNCTION THERAPY: CPT

## 2023-05-03 PROCEDURE — 1200000000 HC SEMI PRIVATE

## 2023-05-03 PROCEDURE — 99232 SBSQ HOSP IP/OBS MODERATE 35: CPT | Performed by: NURSE PRACTITIONER

## 2023-05-03 PROCEDURE — 36415 COLL VENOUS BLD VENIPUNCTURE: CPT

## 2023-05-03 PROCEDURE — 2580000003 HC RX 258: Performed by: INTERNAL MEDICINE

## 2023-05-03 PROCEDURE — 2700000000 HC OXYGEN THERAPY PER DAY

## 2023-05-03 PROCEDURE — 6370000000 HC RX 637 (ALT 250 FOR IP): Performed by: INTERNAL MEDICINE

## 2023-05-03 PROCEDURE — 85025 COMPLETE CBC W/AUTO DIFF WBC: CPT

## 2023-05-03 PROCEDURE — 80048 BASIC METABOLIC PNL TOTAL CA: CPT

## 2023-05-03 PROCEDURE — 6360000002 HC RX W HCPCS: Performed by: STUDENT IN AN ORGANIZED HEALTH CARE EDUCATION/TRAINING PROGRAM

## 2023-05-03 PROCEDURE — 97116 GAIT TRAINING THERAPY: CPT

## 2023-05-03 RX ORDER — ALBUTEROL SULFATE 2.5 MG/3ML
2.5 SOLUTION RESPIRATORY (INHALATION) EVERY 4 HOURS PRN
Qty: 120 EACH | Refills: 3 | Status: SHIPPED | OUTPATIENT
Start: 2023-05-03

## 2023-05-03 RX ORDER — POLYETHYLENE GLYCOL 3350 17 G/17G
17 POWDER, FOR SOLUTION ORAL DAILY PRN
Qty: 527 G | Refills: 1 | Status: SHIPPED | OUTPATIENT
Start: 2023-05-03 | End: 2023-06-02

## 2023-05-03 RX ORDER — METRONIDAZOLE 500 MG/1
500 TABLET ORAL EVERY 8 HOURS SCHEDULED
Status: DISCONTINUED | OUTPATIENT
Start: 2023-05-03 | End: 2023-05-04 | Stop reason: HOSPADM

## 2023-05-03 RX ORDER — AMOXICILLIN 250 MG/1
250 CAPSULE ORAL EVERY 8 HOURS SCHEDULED
Qty: 15 CAPSULE | Refills: 0 | Status: SHIPPED | OUTPATIENT
Start: 2023-05-03 | End: 2023-05-08

## 2023-05-03 RX ORDER — METRONIDAZOLE 500 MG/1
500 TABLET ORAL EVERY 8 HOURS SCHEDULED
Qty: 15 TABLET | Refills: 0 | Status: SHIPPED | OUTPATIENT
Start: 2023-05-03 | End: 2023-05-08

## 2023-05-03 RX ORDER — AMOXICILLIN 250 MG/1
250 CAPSULE ORAL EVERY 8 HOURS SCHEDULED
Status: DISCONTINUED | OUTPATIENT
Start: 2023-05-03 | End: 2023-05-04 | Stop reason: HOSPADM

## 2023-05-03 RX ADMIN — SODIUM CHLORIDE, PRESERVATIVE FREE 10 ML: 5 INJECTION INTRAVENOUS at 08:54

## 2023-05-03 RX ADMIN — AMOXICILLIN 250 MG: 250 CAPSULE ORAL at 14:47

## 2023-05-03 RX ADMIN — ASPIRIN 81 MG: 81 TABLET, COATED ORAL at 08:52

## 2023-05-03 RX ADMIN — CARVEDILOL 6.25 MG: 6.25 TABLET, FILM COATED ORAL at 16:25

## 2023-05-03 RX ADMIN — DORZOLAMIDE HYDROCHLORIDE AND TIMOLOL MALEATE 1 DROP: 20; 5 SOLUTION/ DROPS OPHTHALMIC at 21:12

## 2023-05-03 RX ADMIN — DORZOLAMIDE HYDROCHLORIDE AND TIMOLOL MALEATE 1 DROP: 20; 5 SOLUTION/ DROPS OPHTHALMIC at 08:54

## 2023-05-03 RX ADMIN — CARVEDILOL 6.25 MG: 6.25 TABLET, FILM COATED ORAL at 08:52

## 2023-05-03 RX ADMIN — METRONIDAZOLE 500 MG: 500 TABLET ORAL at 21:12

## 2023-05-03 RX ADMIN — METRONIDAZOLE 500 MG: 500 TABLET ORAL at 14:47

## 2023-05-03 RX ADMIN — SERTRALINE 50 MG: 50 TABLET, FILM COATED ORAL at 08:52

## 2023-05-03 RX ADMIN — ENOXAPARIN SODIUM 30 MG: 100 INJECTION SUBCUTANEOUS at 08:51

## 2023-05-03 RX ADMIN — LOSARTAN POTASSIUM 50 MG: 50 TABLET, FILM COATED ORAL at 08:52

## 2023-05-03 RX ADMIN — LATANOPROST 1 DROP: 50 SOLUTION OPHTHALMIC at 21:12

## 2023-05-03 RX ADMIN — AMOXICILLIN 250 MG: 250 CAPSULE ORAL at 09:25

## 2023-05-03 RX ADMIN — AMOXICILLIN 250 MG: 250 CAPSULE ORAL at 21:12

## 2023-05-03 RX ADMIN — METRONIDAZOLE 500 MG: 500 TABLET ORAL at 09:26

## 2023-05-03 ASSESSMENT — PAIN SCALES - GENERAL: PAINLEVEL_OUTOF10: 0

## 2023-05-03 NOTE — DISCHARGE INSTR - DIET

## 2023-05-03 NOTE — CARE COORDINATION
REQUEST FOR SKILLED FACILITY AUTHORIZATION SUBMITTED TO [facility] MEDICARE:    Patient name:  Onur Allison    Current Location: [facility] AT [facility] Inpatient    Admit date to hospital: 4/30/23    Requested Setting:  [facility]    Anticipated Admit Date to SNF Level of Care:  5/4/23    ORDERING MD: Marisol Williamson MD    Electronically signed by Ariana Palafox RN on 5/3/2023 at 4:00 PM

## 2023-05-03 NOTE — ACP (ADVANCE CARE PLANNING)
Advance Care Planning     Advance Care Planning Activator (Inpatient)  Conversation Note      Date of ACP Conversation: 5/3/2023     Conversation Conducted with: Patient with Decision Making Capacity    ACP Activator: Henry Badillo RN    Health Care Decision Maker:     Current Designated Health Care Decision Maker:     Primary Decision Maker: Izzy Doe - Child - [phone]    Primary Decision Maker: Brielle Cartwright Child - [phone]    Care Preferences    Ventilation: \"If you were in your present state of health and suddenly became very ill and were unable to breathe on your own, what would your preference be about the use of a ventilator (breathing machine) if it were available to you? \"      Would the patient desire the use of ventilator (breathing machine)?: yes    \"If your health worsens and it becomes clear that your chance of recovery is unlikely, what would your preference be about the use of a ventilator (breathing machine) if it were available to you? \"     Would the patient desire the use of ventilator (breathing machine)?: No      Resuscitation  \"CPR works best to restart the heart when there is a sudden event, like a heart attack, in someone who is otherwise healthy. Unfortunately, CPR does not typically restart the heart for people who have serious health conditions or who are very sick. \"    \"In the event your heart stopped as a result of an underlying serious health condition, would you want attempts to be made to restart your heart (answer \"yes\" for attempt to resuscitate) or would you prefer a natural death (answer \"no\" for do not attempt to resuscitate)? \" yes       [] Yes   [x] No   Educated Patient / Erasmo Jointaustin regarding differences between Advance Directives and portable DNR orders.     Length of ACP Conversation in minutes:  3    Conversation Outcomes:  ACP discussion completed    Follow-up plan:    [] Schedule follow-up conversation to continue planning  [] Referred individual to

## 2023-05-03 NOTE — CARE COORDINATION
5/3 Met with patient at bedside to discuss discharge planning. Patient is agreeable to SNF and [facility]. Needs precert and HENs.  Electronically signed by Severiano Ochs, RN on 5/3/2023 at 11:05 AM

## 2023-05-03 NOTE — CARE COORDINATION
05/03/23 1108   IMM Letter   IMM Letter given to Patient/Family/Significant other/Guardian/POA/by: To patient by Fabrice Goldsmith RN. Education provided to patient, patient reported no questions and verbalized understanding. Patient aware of 4 hours allotted time to determine if they choose to pursue Medicare appeal process.    IMM Letter date given: 05/03/23   IMM Letter time given: 1110

## 2023-05-04 VITALS
OXYGEN SATURATION: 93 % | HEART RATE: 60 BPM | SYSTOLIC BLOOD PRESSURE: 143 MMHG | HEIGHT: 67 IN | DIASTOLIC BLOOD PRESSURE: 79 MMHG | TEMPERATURE: 97.6 F | BODY MASS INDEX: 32.84 KG/M2 | WEIGHT: 209.22 LBS | RESPIRATION RATE: 16 BRPM

## 2023-05-04 PROCEDURE — 97535 SELF CARE MNGMENT TRAINING: CPT

## 2023-05-04 PROCEDURE — 6360000002 HC RX W HCPCS: Performed by: STUDENT IN AN ORGANIZED HEALTH CARE EDUCATION/TRAINING PROGRAM

## 2023-05-04 PROCEDURE — 97116 GAIT TRAINING THERAPY: CPT

## 2023-05-04 PROCEDURE — 6370000000 HC RX 637 (ALT 250 FOR IP): Performed by: INTERNAL MEDICINE

## 2023-05-04 PROCEDURE — 6370000000 HC RX 637 (ALT 250 FOR IP): Performed by: STUDENT IN AN ORGANIZED HEALTH CARE EDUCATION/TRAINING PROGRAM

## 2023-05-04 PROCEDURE — 2580000003 HC RX 258: Performed by: INTERNAL MEDICINE

## 2023-05-04 PROCEDURE — 94760 N-INVAS EAR/PLS OXIMETRY 1: CPT

## 2023-05-04 PROCEDURE — 97530 THERAPEUTIC ACTIVITIES: CPT

## 2023-05-04 PROCEDURE — 92526 ORAL FUNCTION THERAPY: CPT

## 2023-05-04 RX ORDER — KETOROLAC TROMETHAMINE 15 MG/ML
15 INJECTION, SOLUTION INTRAMUSCULAR; INTRAVENOUS ONCE
Status: COMPLETED | OUTPATIENT
Start: 2023-05-04 | End: 2023-05-04

## 2023-05-04 RX ADMIN — SERTRALINE 50 MG: 50 TABLET, FILM COATED ORAL at 09:21

## 2023-05-04 RX ADMIN — KETOROLAC TROMETHAMINE 15 MG: 15 INJECTION, SOLUTION INTRAMUSCULAR; INTRAVENOUS at 15:05

## 2023-05-04 RX ADMIN — ASPIRIN 81 MG: 81 TABLET, COATED ORAL at 09:21

## 2023-05-04 RX ADMIN — METRONIDAZOLE 500 MG: 500 TABLET ORAL at 15:04

## 2023-05-04 RX ADMIN — ENOXAPARIN SODIUM 30 MG: 100 INJECTION SUBCUTANEOUS at 09:21

## 2023-05-04 RX ADMIN — AMOXICILLIN 250 MG: 250 CAPSULE ORAL at 15:04

## 2023-05-04 RX ADMIN — AMOXICILLIN 250 MG: 250 CAPSULE ORAL at 05:13

## 2023-05-04 RX ADMIN — CARVEDILOL 6.25 MG: 6.25 TABLET, FILM COATED ORAL at 09:21

## 2023-05-04 RX ADMIN — SODIUM CHLORIDE, PRESERVATIVE FREE 10 ML: 5 INJECTION INTRAVENOUS at 09:21

## 2023-05-04 RX ADMIN — LOSARTAN POTASSIUM 50 MG: 50 TABLET, FILM COATED ORAL at 09:21

## 2023-05-04 RX ADMIN — METRONIDAZOLE 500 MG: 500 TABLET ORAL at 05:13

## 2023-05-04 RX ADMIN — DORZOLAMIDE HYDROCHLORIDE AND TIMOLOL MALEATE 1 DROP: 20; 5 SOLUTION/ DROPS OPHTHALMIC at 11:11

## 2023-05-04 ASSESSMENT — PAIN DESCRIPTION - ORIENTATION: ORIENTATION: MID

## 2023-05-04 ASSESSMENT — PAIN SCALES - GENERAL
PAINLEVEL_OUTOF10: 0
PAINLEVEL_OUTOF10: 6

## 2023-05-04 ASSESSMENT — PAIN DESCRIPTION - LOCATION: LOCATION: HEAD

## 2023-05-04 NOTE — PLAN OF CARE
Problem: Discharge Planning  Goal: Discharge to home or other facility with appropriate resources  5/3/2023 2115 by Cassandra Saint, RN  Outcome: Progressing  5/3/2023 1526 by Leticia Dave RN  Outcome: Progressing     Problem: Pain  Goal: Verbalizes/displays adequate comfort level or baseline comfort level  5/3/2023 2115 by Cassandra Saint, RN  Outcome: Progressing  5/3/2023 1526 by Leticia Dave RN  Outcome: Progressing     Problem: Safety - Adult  Goal: Free from fall injury  5/3/2023 2115 by Cassandra Saint, RN  Outcome: Progressing  5/3/2023 1526 by Leticia Dave RN  Outcome: Progressing     Problem: ABCDS Injury Assessment  Goal: Absence of physical injury  5/3/2023 2115 by Cassandra Saint, RN  Outcome: Progressing  5/3/2023 1526 by Leticia Dave RN  Outcome: Progressing     Problem: Respiratory - Adult  Goal: Achieves optimal ventilation and oxygenation  5/3/2023 2115 by Cassandra Saint, RN  Outcome: Progressing  5/3/2023 1526 by Leticia Dave RN  Outcome: Progressing     Problem: Cardiovascular - Adult  Goal: Maintains optimal cardiac output and hemodynamic stability  5/3/2023 2115 by Cassandra Saint, RN  Outcome: Progressing  5/3/2023 1526 by Leticia Dave RN  Outcome: Progressing  Goal: Absence of cardiac dysrhythmias or at baseline  5/3/2023 2115 by Cassandra Saint, RN  Outcome: Progressing  5/3/2023 1526 by Leticia Daev RN  Outcome: Progressing     Problem: Musculoskeletal - Adult  Goal: Return mobility to safest level of function  5/3/2023 2115 by Cassandra Saint, RN  Outcome: Progressing  5/3/2023 1526 by Leticia Dave RN  Outcome: Progressing  Goal: Maintain proper alignment of affected body part  5/3/2023 2115 by Cassandra Saint, RN  Outcome: Progressing  5/3/2023 1526 by Leticia Dave RN  Outcome: Progressing  Goal: Return ADL status to a safe level of function  5/3/2023 2115 by Cassandra Saint, RN  Outcome: Progressing  5/3/2023 1526 by
Problem: Discharge Planning  Goal: Discharge to home or other facility with appropriate resources  Outcome: [facility] Progressing  Flowsheets (Taken 5/4/2023 1000)  Discharge to home or other facility with appropriate resources: Identify barriers to discharge with patient and caregiver     Problem: Pain  Goal: Verbalizes/displays adequate comfort level or baseline comfort level  Outcome: HH/[facility]C Progressing     Problem: Safety - Adult  Goal: Free from fall injury  Outcome: [facility] Progressing     Problem: ABCDS Injury Assessment  Goal: Absence of physical injury  Outcome: [facility] Progressing     Problem: Respiratory - Adult  Goal: Achieves optimal ventilation and oxygenation  Outcome: HH/[facility]C Progressing  Flowsheets (Taken 5/4/2023 1000)  Achieves optimal ventilation and oxygenation: Assess for changes in respiratory status     Problem: Cardiovascular - Adult  Goal: Maintains optimal cardiac output and hemodynamic stability  Outcome: HH/[facility]C Progressing  Flowsheets (Taken 5/4/2023 1000)  Maintains optimal cardiac output and hemodynamic stability: Monitor blood pressure and heart rate  Goal: Absence of cardiac dysrhythmias or at baseline  Outcome: [facility] Progressing  Flowsheets (Taken 5/4/2023 1000)  Absence of cardiac dysrhythmias or at baseline: Monitor cardiac rate and rhythm     Problem: Musculoskeletal - Adult  Goal: Return mobility to safest level of function  Outcome: [facility] (Taken 5/4/2023 1000)  Return Mobility to Safest Level of Function: Assess patient stability and activity tolerance for standing, transferring and ambulating with or without assistive devices  Goal: Maintain proper alignment of affected body part  Outcome: HH/HSPC Progressing  Flowsheets (Taken 5/4/2023 1000)  Maintain proper alignment of affected body part: Support and protect limb and body alignment per provider's orders  Goal: Return ADL status to a safe level of function  Outcome: [facility]
Problem: Discharge Planning  Goal: Discharge to home or other facility with appropriate resources  Outcome: Progressing     Problem: Pain  Goal: Verbalizes/displays adequate comfort level or baseline comfort level  Outcome: Progressing     Problem: Safety - Adult  Goal: Free from fall injury  Outcome: Progressing     Problem: ABCDS Injury Assessment  Goal: Absence of physical injury  Outcome: Progressing     Problem: Respiratory - Adult  Goal: Achieves optimal ventilation and oxygenation  Outcome: Progressing     Problem: Cardiovascular - Adult  Goal: Maintains optimal cardiac output and hemodynamic stability  Outcome: Progressing  Goal: Absence of cardiac dysrhythmias or at baseline  Outcome: Progressing     Problem: Musculoskeletal - Adult  Goal: Return mobility to safest level of function  Outcome: Progressing  Goal: Maintain proper alignment of affected body part  Outcome: Progressing  Goal: Return ADL status to a safe level of function  Outcome: Progressing     Problem: Metabolic/Fluid and Electrolytes - Adult  Goal: Electrolytes maintained within normal limits  Outcome: Progressing  Goal: Hemodynamic stability and optimal renal function maintained  Outcome: Progressing  Goal: Glucose maintained within prescribed range  Outcome: Progressing     Problem: Hematologic - Adult  Goal: Maintains hematologic stability  Outcome: Progressing
Keenan Olguin RN  Outcome: Progressing     Problem: Metabolic/Fluid and Electrolytes - Adult  Goal: Electrolytes maintained within normal limits  5/2/2023 2128 by Harinder Munoz RN  Outcome: Progressing  5/2/2023 1546 by Keenan Olguin RN  Outcome: Progressing  Goal: Hemodynamic stability and optimal renal function maintained  5/2/2023 2128 by Harinder Munoz RN  Outcome: Progressing  5/2/2023 1546 by Keenan Olguin RN  Outcome: Progressing  Goal: Glucose maintained within prescribed range  5/2/2023 2128 by Harinder Munoz RN  Outcome: Progressing  5/2/2023 1546 by Keenan Olguin RN  Outcome: Progressing     Problem: Hematologic - Adult  Goal: Maintains hematologic stability  5/2/2023 2128 by Harinder Munoz RN  Outcome: Progressing  5/2/2023 1546 by Keenan Olguin RN  Outcome: Progressing
Progressing

## 2023-05-04 NOTE — CARE COORDINATION
Case Management Discharge Note    Date / Time of Note: 5/4/2023 2:35 PM                  Patient Name: Miguelito Gore     YOB: 1940  Diagnosis: Acute respiratory failure with hypoxia ([facility] [J96.01]  Acute on chronic diastolic (congestive) heart failure ([facility] [I50.33]  Acute on chronic congestive heart failure, unspecified heart failure type [facility]) [I50.9]     Date / Time: 4/30/2023  5:35 PM    Financial:  Payor: Antonino Rivas / Plan: Levi 1978 / Product Type: *No Product type* /      Pharmacy:  No Pharmacies Listed    Assistance purchasing medications?: Potential Assistance Purchasing Medications: No  Assistance provided by Case Management: None at this time    DISCHARGE Disposition: [facility] ([facility])    Nursing Facility:   Discharging to Facility/ Agency   Name: [facility]  Address:  [address] [place] [address]   Phone:  [phone]  Fax:  [phone]     LOC at discharge: [place] Completed: Yes             NURSING REPORT NUMBER: [phone]             NURSING FAX NUMBER: [phone]    Notification completed in HENS/PAS?:  Yes    Durable Medical Equipment:  Equipment obtained during hospitalization: None    Home Oxygen and Respiratory Equipment:  Oxygen needed at discharge?: No    Dialysis:  Dialysis patient: No    Transportation:  Transportation PLAN for discharge: EMS transportation   Mode of Transport: Ambulance stretcher - [facility]  Name of [address]: [facility] [facility]  Phone: [phone]  Time of Transport: 4:45 PM    Transport form completed: Yes    IMM Completed:   Yes, Case management has presented and reviewed IMM letter #2. IMM Letter given to Patient/Family/Significant other/Guardian/POA/by[de-identified] To patient by Elza Villalobos RN. Education provided to patient, patient reported no questions and verbalized understanding.  Patient aware of 4 hours allotted time to determine if they

## 2023-05-04 NOTE — PROGRESS NOTES
Admitted patient to room [phone] from the emergency department. Oriented to room, call light, tv, phone and dietary services. Respirations easy unlabored, denies pain. Bed in lowest position and locked. Exit alarms in place. Non slip socks on. ID bracelet on and correct per patient verbally reporting name and date of birth. Call light and needed items in reach.
[address]   Daily Progress Note      Admit Date:  4/30/2023    CC: cough/ SOB    HPI:   Thais Pool is a 78 y.o. male with PMH DHF, SA dysfunction s/p BiVICD, HTN, hs paroxysmal VT, MV insufficiency, HLP. Follows with [facility] Cardiology Dr. Anirudh Mcmillan- recently seem 4/25/2023. Adm to W with hypoxic resp failure. C/O sore throat, non prod cough, increased BLE edema. Reports symptoms started after consuming large meal a Srinivasan's that most likely consisted of high Na. He is typically non compliant with Na restriction as reported by his family. Cardiology consulted for elevated BNP and troponin. Started on IV lasix. Developed EDEN. GI consulted for dysphagia/ throat burning. Needs swallow eval.  Concern for aspiration PNA. Patient sitting in chair. C/O generalized weakness and fatigue. O2 weaned from 5 to 2L NC. Currently on RA, working with PT and denies SOB. Denies CP, LH, dizziness, palpitations, syncope. BLE edema resolved. Review of Systems:   General: Denies fever, chills,  Skin: Denies skin changes, rash, itching, lesions.   HEENT: Denies headache, dizziness, vision changes, nosebleeds, sore throat, nasal drainage  RESP: Denies cough, sputum, wheeze, snoring  CARD: Denies palpitations,  murmur  GI:Denies nausea, vomiting, heartburn, loss of appetite, change in bowels  : Denies frequency, pain, incontinence, polyuria  VASC: Denies claudication, leg cramps, clots  MUSC/SKEL: Denies pain, stiffness, arthritis  PSYCH: Denies anxiety, depression, stress  NEURO: Denies numbness, tingling, weakness,change in mood or memory  HEME: Denies abn bruising, bleeding, anemia  ENDO: Denies intolerance to heat, cold, excessive thirst or hunger, hx thyroid disease    Objective:   /78   Pulse 59   Temp 97.8 °F (36.6 °C) (Oral)   Resp 16   Ht 5' 7\" (1.702 m)   Wt 210 lb 15.7 oz (95.7 kg)   SpO2 96%   BMI 33.04 kg/m²         Intake/Output Summary (Last 24 hours) at
CLINICAL PHARMACY NOTE: MEDS TO BEDS    Retail pharmacy has been notified that the patient is either going to a SNF, ARU, or other inpatient facility. All prescriptions sent to the retail pharmacy for this patient will be kept on profile unless told otherwise.     05/03/23 1:55 PM
Facility/Department: [address] MED SURG  Initial Assessment  DYSPHAGIA BEDSIDE SWALLOW EVALUATION     Patient: Ham Ambrocio   : 1940   MRN: [id number]      Evaluation Date: 2023   Admitting Diagnosis:   Acute respiratory failure with hypoxia (HCC) [J96.01]  Acute on chronic diastolic (congestive) heart failure (HCC) [I50.33]  Acute on chronic congestive heart failure, unspecified heart failure type ([address] [I50.9]    Pain: Did not state                                                       CHART REVIEW:  2023 admitted with c/o sore throat, cough, SOB  MD ADMISSION H&P HPI:  He presents to the emergency room for evaluation following a 3-4 day h/o increasing lower extremity edema, shortness of breath (with non productive cough), a sore throat and difficulty walking. He reports that he drank a big amount of apple cider vinegar and feels he may have burned his throat. His shortness of breath has been moderately severe, worse with exertion and improved with rest.  He states that his symptoms began after he went to a  for a friend. At the , he ate, \"a lot\" of Srinivasan's food including pizza, lasagna etc.  His daughter, at bedside, reports that he eats out almost every meal and she is concerned that his meals are high sodium. The patient states that he puts a lot of salt on most things that he eats. On evaluation in the emergency room he became hypoxic into the 80's when walking. He was found to have acute on chronic diastolic congestive heart failure along with acute hypoxic respiratory failure. His initial high-sensitivity troponin was mildly elevated, and his EKG had no acute ischemic changes. Associated signs and symptoms do not include chest pain, lightheaded, dizziness, diaphoresis, orthopnea, paroxysmal nocturnal dyspnea, fever or chills. No recent medication changes. He is being admitted for further evaluation and treatment.     IMAGING:  CXR: 2023  IMPRESSION:  Mild bibasilar
[facility]    Swallowing evaluation noted  No significant diet modification required  He does have intermittent solid food dysphagia  The patient's son will call after the patient has been discharged to schedule an elective outpatient EGD with esophageal dilatation
I was called by RN to NT suction patient per order Dr. Fredy Rodney. Assessed BBS and patient found to be wheezing in right lung. Placed order for PRN Albuterol nebs per RT Protocol. After nebulizer,  I nasotracheally suctioned patient for large amount thin tan/white secretions. BBS improved after suction.
[employer] Report received and placed in [facility]  Full note dictated    IMP:  Consulted for dysphagia  Th patient may have aspirated apple cider vinegar per the admission H&P  Otherwise, his only dysphagia complaint is infrequent solid food dysphagia with eating chicken; he complains of no other issues with eating or drinking  For example, he ate a sandwich in the ER last night without problems  The speech pathologists will consult  They may want to proceed with a MBS, but overall, by the history given by the patient and his son, there seems to be a minimal dysphagia problem  Pending the findings by the SLP, the patient might be a candidate for an elective EGD with esophageal dilatation, but he presently appears to be at significant risk for sedation/anesthesia
Occupational Therapy  Facility/Department: [facility]  Occupational Therapy Daily Note    Name: Paresh Sanchez  : 1940  MRN: [id number]  Date of Service: 2023    Discharge Recommendations:  3-5 sessions per week, Patient would benefit from continued therapy after discharge   Paresh Sanchez scored a 16/24 on the AM-PAC ADL Inpatient form. Current research shows that an AM-PAC score of 17 or less is typically not associated with a discharge to the patient's home setting. Based on the patient's AM-PAC score and their current ADL deficits, it is recommended that the patient have 3-5 sessions per week of Occupational Therapy at d/c to increase the patient's independence. Please see assessment section for further patient specific details. If patient discharges prior to next session this note will serve as a discharge summary. Please see below for the latest assessment towards goals. Patient Diagnosis(es): The primary encounter diagnosis was Acute respiratory failure with hypoxia ([facility] A diagnosis of Acute on chronic congestive heart failure, unspecified heart failure type [facility]) was also pertinent to this visit. Past Medical History:  has a past medical history of Cancer ([facility] Heart bloc, Sinus congestion, and Vertigo. Past Surgical History:  has a past surgical history that includes Pacemaker insertion (); pre-malignant / benign skin lesion excision; other surgical history; Cystocopy (); Pacemaker insertion (); and Cardiac defibrillator placement. Assessment   Performance deficits / Impairments: Decreased functional mobility ; Decreased ADL status; Decreased ROM; Decreased strength;Decreased cognition;Decreased balance;Decreased endurance;Decreased high-level IADLs  Assessment: Discussed with OTR:Pt tolerated session fairly well. Pt CG/Min A cues for safety for transfers, functional mob in room.  Pt required up to mod A for LB bathing, dressing, Pt CGA for
Occupational Therapy  Facility/Department: [facility]  Occupational Therapy Daily Treatment Note    Name: Kyle Gavin  : 1940  MRN: [id number]  Date of Service: 5/3/2023    Discharge Recommendations:  3-5 sessions per week, Patient would benefit from continued therapy after discharge  OT Equipment Recommendations  Other: TBD    Kyle Gavin scored a 16/24 on the AM-PAC ADL Inpatient form. Current research shows that an AM-PAC score of 17 or less is typically not associated with a discharge to the patient's home setting. Based on the patient's AM-PAC score and their current ADL deficits, it is recommended that the patient have 3-5 sessions per week of Occupational Therapy at d/c to increase the patient's independence. Please see assessment section for further patient specific details. If patient discharges prior to next session this note will serve as a discharge summary. Please see below for the latest assessment towards goals. Patient Diagnosis(es): The primary encounter diagnosis was Acute respiratory failure with hypoxia ([facility] [address] A diagnosis of Acute on chronic congestive heart failure, unspecified heart failure type [facility]) was also pertinent to this visit. Past Medical History:  has a past medical history of Cancer ([facility] [address] Heart bloc, Sinus congestion, and Vertigo. Past Surgical History:  has a past surgical history that includes Pacemaker insertion (); pre-malignant / benign skin lesion excision; other surgical history; Cystocopy (); Pacemaker insertion (); and Cardiac defibrillator placement. Assessment   Performance deficits / Impairments: Decreased functional mobility ; Decreased ADL status; Decreased ROM; Decreased strength;Decreased cognition;Decreased balance;Decreased endurance;Decreased high-level IADLs  Assessment: Pt is an 78 y.o. male admitted with acute hypoxic respiratory failure.  PTA, pt living alone in trailer and independent ADLs, IADLs, and
Physical Therapy  Facility/Department: [address] MED SURG  Physical Therapy Daily Note  If patient discharges prior to next session this note will serve as a discharge summary. Please see below for the latest assessment towards goals. Name: Stacia Shetty  : 1940  MRN: [id number]  Date of Service: 2023    Discharge Recommendations:  Patient would benefit from continued therapy after discharge (3-5)   Stacia Shetty scored a 16/24 on the AM-PAC short mobility form. Current research shows that an AM-PAC score of 17 or less is typically not associated with a discharge to the patient's home setting. Based on the patient's AM-PAC score and their current functional mobility deficits, it is recommended that the patient have 3-5 sessions per week of Physical Therapy at d/c to increase the patient's independence. Please see assessment section for further patient specific details. PT Equipment Recommendations  Equipment Needed: No  Other: defer      Patient Diagnosis(es): The primary encounter diagnosis was Acute respiratory failure with hypoxia ([facility] A diagnosis of Acute on chronic congestive heart failure, unspecified heart failure type [facility]) was also pertinent to this visit. Past Medical History:  has a past medical history of Cancer ([facility] [address] [address] Heart bloc, Sinus congestion, and Vertigo. Past Surgical History:  has a past surgical history that includes Pacemaker insertion (); pre-malignant / benign skin lesion excision; other surgical history; Cystocopy (); Pacemaker insertion (); and Cardiac defibrillator placement. Assessment   Assessment: Today, the pt presented much more awake and able to follow directions with time. The pt con't to be limited by his weakness, balance and activity tolerance. He improved to SBA/CGA for bed mobility, min A for transfers and min A for ambulation with the walker 15 feet in the room.  The pt does have a slight foot drop on the L and walks with a shuffled
Physical Therapy  Facility/Department: [address] MED SURG  Daily Treatment Note  NAME: Rhiannon Tomlinson  : 1940  MRN: [id number]    Date of Service: 2023    Discharge Recommendations:  Continue to assess pending progress, Subacute/Skilled Nursing Facility, 24 hour supervision or assist, Home with Home health PT, Patient would benefit from continued therapy after discharge        Patient Diagnosis(es): The primary encounter diagnosis was Acute respiratory failure with hypoxia ([facility] [address] A diagnosis of Acute on chronic congestive heart failure, unspecified heart failure type [facility]) was also pertinent to this visit. Assessment   Assessment: Pt able to ambulate with walker support, CGA-SBA, cues to keep walker close throughout session. Pt appears intent upon returning directly home, though he has demonstrated some deficits in safety awareness. PT recommends either a short bout of low-moderate frequency therapy, or return home with initial 24/7 assist, home PT level 1. Rhiannon Tomlinson scored a 17/24 on the AM-PAC short mobility form. If patient discharges prior to next session this note will serve as a discharge summary. Please see below for the latest assessment towards goals. Plan    Physcial Therapy Plan  General Plan: 3-5 times per week  Specific Instructions for Next Treatment: Ambulation; transfers; safe mobility  Current Treatment Recommendations: Strengthening;Balance training;Functional mobility training;Transfer training;Gait training; Safety education & training; Therapeutic activities; Patient/Caregiver education & training;Equipment evaluation, education, & procurement     Restrictions  Restrictions/Precautions  Restrictions/Precautions: Fall Risk  Position Activity Restriction  Other position/activity restrictions: IV access, L arm     Subjective    Subjective  Subjective: Pt agreeable to activity.       Objective     Transfer Training  Transfer Training: Yes  Overall Level of
Physical Therapy  Facility/Department: [address] MED SURG  Physical Therapy Initial Assessment  If patient discharges prior to next session this note will serve as a discharge summary. Please see below for the latest assessment towards goals. Name: Vaughn Zhao  : 1940  MRN: [id number]  Date of Service: 2023    Discharge Recommendations:  Continue to assess pending progress, Patient would benefit from continued therapy after discharge (3-5)   Vaughn Zhao scored a 13/24 on the AM-PAC short mobility form. Current research shows that an AM-PAC score of 17 or less is typically not associated with a discharge to the patient's home setting. Based on the patient's AM-PAC score and their current functional mobility deficits, it is recommended that the patient have 3-5 sessions per week of Physical Therapy at d/c to increase the patient's independence. Please see assessment section for further patient specific details. PT Equipment Recommendations  Other: will monitor      Patient Diagnosis(es): The primary encounter diagnosis was Acute respiratory failure with hypoxia ([address] A diagnosis of Acute on chronic congestive heart failure, unspecified heart failure type [facility]) was also pertinent to this visit. Past Medical History:  has a past medical history of Cancer ([address] Heart bloc, Sinus congestion, and Vertigo. Past Surgical History:  has a past surgical history that includes Pacemaker insertion (); pre-malignant / benign skin lesion excision; other surgical history; Cystocopy (); Pacemaker insertion (); and Cardiac defibrillator placement. Assessment   Assessment: The pt is an 79yo male who presented to the ED with sore throat, non productive cough, increased lower extremity edema, shortness of breath and difficulty walking. In the ED he was found to be in acute on chronic CHF and in acute hypoxic resp failure with a mildly elevated troponin.  The pt lives alone in a mobile home
Pt AOX4, pt denied n/v, SOB & diarrhea  & pain- pt denied any further needs at this time - bed in lowest and locked position with bedside table and call light within reach - non skid socks and JUNE on
Pt AOX4, pt denied n/v, SOB & diarrhea - pt c/o 7/10 pain - See MAR - pt denied any further needs at this time - bed in lowest and locked position with bedside table and call light within reach - non skid socks and JUNE on
Pt arrived to room 4119 from ED via stretcher. Pivot to bed. Oriented to room and call light, bedside table and needed items within reach. VSS on 5L NC, nondependent. Purewick in place. Pt resting comfortably in bed, no additional needs at this time.     Electronically signed by Rafiq Beltran RN on 5/1/2023 at 7:04 AM
Removed pt peripheral IV without complications. Dry dressing applied. Gathered pt belongings and completed AVS and DUNCAN. Report called to Nurse Su Thompson at [place]. Transport came to discharge pt.
SLP NOTE    Swallow eval re-attempted. Patient unavailable out of room at diagnostics (echo with CT following). ST to re-attempt as schedule permits unless otherwise notified. Thank you. Apryl Hong MondayAlan, #[id number]  Speech-Language Pathologist  Portable phone: [phone]
Speech Pathology Note      21 :    Orders for Clinical Swallow Evaluation noted. Oral assessment completed with repeated cues for pt to remain awake. Pt takes meds for nsg without difficulty with 1 pill at a time with thin liquid. Plan:   Pt has reportedly not slept for several days per pt and son. Pt is not alert enough for accurate evaluation at this time. D/W nsg and pt's son and will check back with pt after he has slept. 1145:  re-attempt. PT/OT just completed with pt and report pt having difficulty staying alert during walking. They advise pt does not seem able to stay awake for swallow evaluation . D/W nsg and ST will re-attempt at approximately 1300, prior to his CT that is scheduled for 1400.     Richar Peterson MS CCC/SLP [id number]  Speech Language Pathologist  05/01/23  11:56 AM
V2.0  [facility] [place]      Name:  Monika Soriano /Age/Sex: 1940  (78 y.o. male)   MRN & CSN:  [id number] & [id number] Encounter Date/Time: 2023 12:40 PM EDT    Location:  [id number] PCP: Sang Barker Day: 3    Assessment and Plan:   Monika Soriano is a 78 y.o. male with pmh of chronic diastolic heart failure, paroxysmal ventricular tachycardia s/p AICD, essential hypertension, chronic vertigo,hyperlipidemia and mitral valve insufficiency who was admitted with difficulty swallowing, cough,  copious secretion, shortness of breath. Plan:  Acute hypoxic respiratory failure in the setting of acute bronchitis and bronchiolitis with possible aspiration pneumonia and underlying CHF exacerbation: Patient ingested vinegar and had suspected small aspiration of gram-negative. Currently on 3 L nasal cannula. Bilateral lower extremity swelling is much better now. On appropriate diuresis plan. Does have some odynophagia with cough. Was on Zosyn. CT scan of the chest showed signs of acute bronchitis with bronchiolitis. Stable from medical standpoint for discharge to SNF. Order for discharge to be placed. GI and cardiology on board. Speech therapy to evaluate for dietary recommendations. GI has a plan for elective EGD with esophageal dilatation. EDEN in the setting of IV diuresis. Stop with the Lasix and cardiology started the patient on normal saline 100/h for up to 500 mL. Continue Coreg and losartan. Avoid nephrotoxic agents  Acute metabolic encephalopathy in the setting of above  Left-sided foot drop: Chronic. Able to do inversion and eversion left side weaker than the right foot. High risk for falls. Would benefit from rehab.   History of SA node dysfunction s/p PPM s/p AICD  Benign essential hypertension    Comment: Please note this report has been produced using speech recognition software and may contain errors related to that system
V2.0  [facility] [place]      Name:  Monika Soriano /Age/Sex: 1940  (78 y.o. male)   MRN & CSN:  [id number] & [id number] Encounter Date/Time: 2023 2:13 PM EDT    Location[address]  [address] PCP: Jesus Manuel Miller MD       Hospital Day: 2    Assessment and Plan:   Monika Soriano is a 78 y.o. male with pmh of chronic diastolic heart failure, paroxysmal ventricular tachycardia s/p AICD, essential hypertension, chronic vertigo,hyperlipidemia and mitral valve insufficiency who was admitted with difficulty swallowing, cough,  copious secretion, shortness of breath. Assessment:  Odynophagia. Acute onset and start of symptoms after drinking large amount of apple cider vinegar is concerning for caustic injury to the upper GI tract  Possible aspiration pneumonia  Acute hypoxic respiratory failure  Acute metabolic encephalopathy  Heart failure with preserved ejection fraction  History of paroxysmal ventricular tachycardia s/p AICD placement  Essential hypertension    Plan:  1. Keep n.p.o.  2.  Consult speech therapy  3. Consult GI  4. Start Zosyn for aspiration pneumonia  5. CT chest with contrast to rule out any lesion resulting in compression of the esophagus or upper airway  6. ABG  7. Cardiology evaluation    Diet ADULT DIET; Regular; No Added Salt (3-4 gm); 1500 ml   DVT Prophylaxis [x] Lovenox, []  Heparin, [] SCDs, [] Ambulation,  [] Eliquis, [] Xarelto  [] Coumadin   GI Prophylaxis [] Yes          [] No   Code Status Full Code    Disposition Patient requires close monitoring for aspiration pneumonia, odynophagia and encephalopathy. Follow-up GI evaluation and CT chest.     Subjective:     Chief Complaint: Pharyngitis (Drank apple cider vinegar Thursday and throat has been sore since, swelling in feet, unable to ambulate indepedently)       Monika Soriano is a 78 y.o. male who presents with cough with copious, odynophagia and shortness of breath.   He had mildly
V2.0  [facility] [place]      Name:  Lori Rose /Age/Sex: 1940  (78 y.o. male)   MRN & CSN:  [id number] & [id number] Encounter Date/Time: 5/3/2023 12:40 PM EDT    Location:  [id number] PCP: Rosette Simmons Day: 4    Assessment and Plan:   Lori Rose is a 78 y.o. male with pmh of chronic diastolic heart failure, paroxysmal ventricular tachycardia s/p AICD, essential hypertension, chronic vertigo,hyperlipidemia and mitral valve insufficiency who was admitted with difficulty swallowing, cough,  copious secretion, shortness of breath. Disposition: Medically stable for discharge to rehab. Plan:  Acute hypoxic respiratory failure in the setting of acute bronchitis and bronchiolitis with possible aspiration pneumonia and underlying CHF exacerbation: Patient ingested vinegar and had suspected small aspiration of gram-negative. Currently on 2 L nasal cannula. Bilateral lower extremity swelling is much better now. On appropriate diuresis plan. Does have some odynophagia with cough. Was on Zosyn. Changed antibiotic to amoxicillin p.o. and Flagyl p.o. as well. CT scan of the chest showed signs of acute bronchitis with bronchiolitis. Stable from medical standpoint for discharge to SNF. Order for discharge to be placed. GI and cardiology on board. Speech therapy t evaluated the patient and recommended regular diet now. Advance diet as tolerated. GI has a plan for elective EGD with esophageal dilatation outpatient. EDEN in the setting of IV diuresis and Zosyn use. Stopped  the Lasix and cardiology started the patient on normal saline 100/h for up to 500 mL. Continue Coreg and losartan. Avoid nephrotoxic agents. Consider resuming Lasix tomorrow. Zosyn has been discontinued and changed to p.o. antibiotics including amoxicillin and Flagyl. Monitor creatinine tomorrow.   Monitor for any nephrotoxicity  Acute metabolic encephalopathy in the setting
9415  Gross per 24 hour   Intake 1230 ml   Output 500 ml   Net 730 ml     I/O since adm: +140    WEIGHT:Admit Weight: 211 lb 10.3 oz (96 kg)         Today  Weight: 211 lb 6.7 oz (95.9 kg)   DRY WEIGHT:  Wt Readings from Last 3 Encounters:   05/02/23 211 lb 6.7 oz (95.9 kg)   09/17/16 210 lb (95.3 kg)   08/28/16 208 lb 5.4 oz (94.5 kg)       Physical Exam:  GEN: Appears weak, frail, no acute distress  SKIN: Pink, warm, dry. HEENT: PERRLA. No adenopathy. LUNG: AP diameter normal. Decreased bilateral bases. Respiratory effort  increased. HEART: S1S2 A/R. No JVD. No carotid bruit. No murmur, rub or gallop. ABD: Soft, nontender. +BS X 4 quads. No hepatomegaly. EXT: Radial and pedal pulses 2+ and symmetric. Without varicosities. No edema. MUSCSKEL: Good ROM X4 extremities. No deformity. NEURO: A/O X3. Calm and cooperative. Telemetry:     Medications:    dorzolamide-timolol  1 drop Both Eyes BID    losartan  50 mg Oral Daily    aspirin  81 mg Oral Daily    piperacillin-tazobactam  3,375 mg IntraVENous Q8H    benzocaine   Mouth/Throat Once    sertraline  50 mg Oral Daily    carvedilol  6.25 mg Oral BID WC    latanoprost  1 drop Both Eyes Nightly    furosemide  40 mg IntraVENous BID    sodium chloride flush  10 mL IntraVENous 2 times per day    enoxaparin  40 mg SubCUTAneous Daily      sodium chloride       albuterol, sodium chloride flush, sodium chloride, ondansetron, polyethylene glycol, acetaminophen **OR** acetaminophen, Benzocaine-Menthol, benzonatate    Lab Data: I have reviewed all labs below today.    CBC:   Recent Labs     04/30/23  1834 05/01/23  0538 05/02/23  0734   WBC 12.4* 11.0 6.8   HGB 14.1 13.8 13.2*   HCT 43.0 42.5 39.2*   MCV 92.8 92.5 93.1    170 175     BMP:   Recent Labs     04/30/23  1834 05/01/23  0538 05/02/23  0734    140 143   K 3.8 3.8 3.6    102 103   CO2 27 31 28   BUN 19 22* 46*   CREATININE 1.1 1.2 2.4*     GLUCOSE:   Recent Labs     04/30/23  1830
acute hypoxic respiratory failure. His initial high-sensitivity troponin was mildly elevated, and his EKG had no acute ischemic changes. Associated signs and symptoms do not include chest pain, lightheaded, dizziness, diaphoresis, orthopnea, paroxysmal nocturnal dyspnea, fever or chills. No recent medication changes. He is being admitted for further evaluation and treatment. IMAGING:  CXR: 4/30/2023  IMPRESSION:  Mild bibasilar discoid atelectasis or scarring with no infiltrate or effusion. CT CHEST: 5/1/2023  IMPRESSION:  1. Acute bilateral bronchitis and right bronchiolitis. Subjective:     Current Diet Level: regular; Thin liquids    Comments regarding tolerating Current Diet: Patient reports good tolerance      Objective:     Pain: Denies               Vision: []WFL [x]Impaired: wears glasses  Hearing: []WFL [x]Impaired: [place]    Cognitive/behavioral/communication:   Oriented to [x] self [x] place [x] date [x] situation  [x]alert []lethargic  [x]cooperative []self-limiting   []confused   []distractible []agitated []impulsive  [x]verbally responsive []nonverbal []limited verbal responses  [x]follows one step commands []does not follow dx []follows complex commands  []aphasic []dysarthric  [] other:     Respiratory Status: [x]Room Air []O2 via nasal cannula []Other:  Dentition: []Adequate [x]Dentures []Missing Many Teeth []Edentulous []Other:  Vocal Quality: [x]Normal []Dysphonic  []Aphonic  []Hoarse []Wet []Weak []Other:  Volitional Cough: [x]Strong []Weak []Wet []Absent []Congested []Other:  Volitional Swallow:   []Absent  [x]Delayed []Adequate []Required use of drink     Oral Mechanism Exam:  [x]WFL []Mild   [] Moderate  []Severe  []To be assessed  Impaired:   []Left side      []Right side    []Labial ROM/Coordination    []Labial Symmetry   []Lingual ROM/Coordination   []Lingual Symmetry  []Gag  []Other:     Patient Positioning: Upright in chair    PO Trials:   Thin Liquids: via straw; delayed
diastolic congestive heart failure along with acute hypoxic respiratory failure. His initial high-sensitivity troponin was mildly elevated, and his EKG had no acute ischemic changes. Associated signs and symptoms do not include chest pain, lightheaded, dizziness, diaphoresis, orthopnea, paroxysmal nocturnal dyspnea, fever or chills. No recent medication changes. He is being admitted for further evaluation and treatment. IMAGING:  CXR: 4/30/2023  IMPRESSION:  Mild bibasilar discoid atelectasis or scarring with no infiltrate or effusion. CT CHEST: 5/1/2023  IMPRESSION:  1. Acute bilateral bronchitis and right bronchiolitis. Subjective:     Current Diet Level: Easy to chew ;  Thin liquids      Comments regarding tolerating Current Diet: Patient reports good tolerance      Objective:     Pain: Denies               Vision: []WFL [x]Impaired: wears glasses  Hearing: []WFL [x]Impaired: Eek    Cognitive/behavioral/communication:   Oriented to [x] self [x] place [x] date [x] situation  [x]alert []lethargic  [x]cooperative []self-limiting   []confused   []distractible []agitated []impulsive  [x]verbally responsive []nonverbal []limited verbal responses  [x]follows one step commands []does not follow dx []follows complex commands  []aphasic []dysarthric  [] other:     Respiratory Status: []Room Air [x]O2 via nasal cannula []Other:  Dentition: []Adequate [x]Dentures []Missing Many Teeth []Edentulous []Other:  Vocal Quality: [x]Normal []Dysphonic  []Aphonic  []Hoarse []Wet []Weak []Other:  Volitional Cough: [x]Strong []Weak []Wet []Absent []Congested []Other:  Volitional Swallow:   []Absent  [x]Delayed []Adequate []Required use of drink     Oral Mechanism Exam:  [x]WFL []Mild   [] Moderate  []Severe  []To be assessed  Impaired:   []Left side      []Right side    []Labial ROM/Coordination    []Labial Symmetry   []Lingual ROM/Coordination   []Lingual Symmetry  []Gag  []Other:     Patient Positioning: Upright in
Alone  Type of Home: Trailer  Home Layout: One level  Home Access: Stairs to enter with rails  Entrance Stairs - Number of Steps: 2-3  Entrance Stairs - Rails: Both  Bathroom Shower/Tub: Walk-in shower  Bathroom Toilet: Standard  Bathroom Equipment: Shower chair (may have shower chair)  Home Equipment: Renee Womack, rolling  ADL Assistance: Independent (son reports pt is incontinent and should wear depends)  Homemaking Assistance: Independent (cleaning lady 1x/month, pt otherwise independent)  Ambulation Assistance: Independent (with cane most of the time, son reports pt shuffles feet and needs to use RW)  Transfer Assistance: Independent  Active : Yes  Occupation: Retired  Type of Occupation: post-office  IADL Comments: sleeps in regular bed      Objective     Bed mobility  Supine to Sit: Stand by assistance (HOB elevated; increased time, use of rail)  Sit to Supine: Unable to assess (the pt up to the chair at end of session)  Scooting: Stand by assistance    Transfers  Sit to Stand: Contact guard assistance;Minimal Assistance  Stand to Sit: Contact guard assistance;Minimal Assistance  Comment: max vc's for hand placement and vc's for safety and technique when turning to sit    Ambulation  Surface: Level tile  Device: Rolling Walker  Assistance: Contact guard assistance  Quality of Gait: con't to walk behind the frame of the walker but can correct and maintain with vc's; able to pick the L foot up today with less drag  Distance: 50 feet x 1       Balance  Sitting - Static: Good  Sitting - Dynamic: Good;-  Standing - Static: Fair;+ (at the walker)  Standing - Dynamic: Fair (with walker)  Comments: CGA standing at the walker while pulling up brief             AM-PAC Score  AM-PAC Inpatient Mobility Raw Score : 17 (05/03/23 1157)  AM-PAC Inpatient T-Scale Score : 42.13 (05/03/23 1157)  Mobility Inpatient CMS 0-100% Score: 50.57 (05/03/23 1157)  Mobility Inpatient CMS G-Code Modifier : CK (05/03/23 1157)
Conjunctiva/sclera: Conjunctivae normal.      Pupils: Pupils are equal, round, and reactive to light. Cardiovascular:      Rate and Rhythm: Normal rate and regular rhythm. Pulses: Normal pulses. Heart sounds: Normal heart sounds. No murmur heard. Pulmonary:      Effort: Respiratory distress present. Breath sounds: Rales present. No wheezing or rhonchi. Abdominal:      General: Abdomen is flat. Bowel sounds are normal. There is no distension. Palpations: Abdomen is soft. Tenderness: There is no abdominal tenderness. Musculoskeletal:         General: No deformity. Normal range of motion. Cervical back: Normal range of motion and neck supple. Right lower leg: No edema. Left lower leg: No edema. Skin:     Coloration: Skin is not jaundiced or pale. Neurological:      General: No focal deficit present. Mental Status: He is alert and oriented to person, place, and time. Mental status is at baseline. Motor: Weakness present.    Psychiatric:         Mood and Affect: Mood normal.         Behavior: Behavior normal.          Medications:   Medications:    amoxicillin  250 mg Oral 3 times per day    metroNIDAZOLE  500 mg Oral 3 times per day    enoxaparin  30 mg SubCUTAneous Daily    dorzolamide-timolol  1 drop Both Eyes BID    losartan  50 mg Oral Daily    aspirin  81 mg Oral Daily    benzocaine   Mouth/Throat Once    sertraline  50 mg Oral Daily    carvedilol  6.25 mg Oral BID WC    latanoprost  1 drop Both Eyes Nightly    sodium chloride flush  10 mL IntraVENous 2 times per day      Infusions:    sodium chloride 5 mL/hr at 05/02/23 1119     PRN Meds: albuterol, 2.5 mg, Q4H PRN  sodium chloride flush, 10 mL, PRN  sodium chloride, , PRN  ondansetron, 4 mg, Q4H PRN  polyethylene glycol, 17 g, Daily PRN  acetaminophen, 650 mg, Q4H PRN   Or  acetaminophen, 650 mg, Q4H PRN  Benzocaine-Menthol, 1 lozenge, Q2H PRN  benzonatate, 200 mg, TID PRN        Labs      No results
for eval. Pt with no complaints. Social/Functional History  Social/Functional History  Lives With: Alone  Type of Home: Trailer  Home Layout: One level  Home Access: Stairs to enter with rails  Entrance Stairs - Number of Steps: 2-3  Entrance Stairs - Rails: Both  Bathroom Shower/Tub: Walk-in shower  Bathroom Toilet: Standard  Bathroom Equipment: Shower chair (may have shower chair)  Home Equipment: Kirill Nones, rolling  ADL Assistance: Independent (son reports pt is incontinent and should wear depends)  Homemaking Assistance: Independent (cleaning lady 1x/month, pt otherwise independent)  Ambulation Assistance: Independent (with cane most of the time, son reports pt shuffles feet and needs to use RW)  Transfer Assistance: Independent  Active : Yes  Occupation: Retired  Type of Occupation: post-office  IADL Comments: sleeps in regular bed       Objective       Safety Devices  Type of Devices: Bed alarm in place;Call light within reach; Left in bed;Gait belt;Nurse notified    AROM: Generally decreased, functional (limitations at shoulder, WFL distally)  Strength: Generally decreased, functional (pt had difficulty following commands for MMT d/t decreased alertness/drowsiness)  Sensation:  (ADWOA d/t decreased cognition)    ADL  LE Dressing: Dependent/Total  LE Dressing Skilled Clinical Factors: to don socks  Toileting: Dependent/Total  Toileting Skilled Clinical Factors: external purewick catheter  Additional Comments: Anticipate pt is mod/max A bathing and dressing, min A grooming and feeding based on cognition, balance, endurance, strength/ROM. Activity Tolerance  Activity Tolerance: Other (comment)  Activity Tolerance Comments: limited today due to pt's sleepiness    Bed mobility  Supine to Sit: Maximum assistance;2 Person assistance (d/t sleepiness)  Sit to Supine: Moderate assistance  Scooting:  Moderate assistance    Transfers  Sit to stand: Minimal assistance;2 Person assistance (EOB>RW)  Stand to
oriented x4; verbally responsive and follows dx. Clinically, appears to present with mild oropharyngeal dysphagia characterized by decreased mastication, decreased lingual manipulation, delayed swallow, and decreased laryngeal elevation. Bolus formation is prolonged but adequate even with regular texture solids. No overt signs/symptoms of penetration/aspiration. No globus complaints. Recommend cont easy to chew diet texture with thin liquids with ST to monitor for tolerance and upgrade/downgrade needs. Recommended Diet and Intervention 5/2/2023:  Diet Solids Recommendation:  Easy to chew  Liquid Consistency Recommendation: Thin liquids  Recommended form of Meds: Meds whole with water     Compensatory Swallowing Strategies:  Upright as possible with all PO intake , Small bites/sips , Eat/feed slowly, Remain upright 30-45 min     EDUCATION:   Provided education regarding role of SLP, results of assessment, recommendations and general speech pathology plan of care. [] Pt verbalized understanding and agreement   [x] Pt requires ongoing learning   [x] No evidence of comprehension     Dysphagia Prognosis: [x] good []fair []poor []guarded     Plan:     Continue Dysphagia Therapy: YES    Interventions: Diet Tolerance Monitoring , Patient/Family Education   Duration/Frequency of therapy while on unit: Speech therapy for dysphagia tx 3-5 times per week during acute care stay. Discharge Instructions:   Discharge recommendations to be determined pending ongoing follow-up during acute care stay    This note serves as a D/C Summary in the event that this patient is discharged prior to the next therapy session.     Coded treatment time: 0  Total treatment time: [address] [place], [place] [id number] [place], [place], [address] [place] [address]  Speech-Language Pathologist  On 05/02/23 at 9:08 AM

## 2023-05-04 NOTE — CARE COORDINATION
[address] \[facility]\""    Authorization ID: [id number]     Start date: 5/4/23-5/8/23    Next review date: 5/8/23  Electronically signed by James Aguilar RN on 5/4/2023 at 2:34 PM

## 2023-05-04 NOTE — DISCHARGE SUMMARY
V2.0  Discharge Summary    Name:  Ed Cisneros /Age/Sex: 1940 (78 y.o. male)   Admit Date: 2023  Discharge Date: 23    MRN & CSN:  [id number] & [id number] Encounter Date and Time 23 7:26 PM EDT    Attending:  No att. providers found Discharging Provider: Jacqui Renteria MD       Hospital Course:     Brief HPI:Elliot Eastman is a 78 y.o. male with pmh of chronic diastolic heart failure, paroxysmal ventricular tachycardia s/p AICD, essential hypertension, chronic vertigo,hyperlipidemia and mitral valve insufficiency who was admitted with difficulty swallowing, cough,  copious secretion, shortness of breath. Disposition: Medically stable for discharge to rehab. Pending pre-CERT        Plan:  Acute hypoxic respiratory failure in the setting of acute bronchitis and bronchiolitis with possible aspiration pneumonia and underlying CHF exacerbation: Patient ingested vinegar and had suspected small aspiration of gram-negative. Currently on 2 L nasal cannula. Bilateral lower extremity swelling is much better now. On appropriate diuresis plan. Does have some odynophagia with cough. Was on Zosyn. Changed antibiotic to amoxicillin p.o. and Flagyl p.o. as well. CT scan of the chest showed signs of acute bronchitis with bronchiolitis. Stable from medical standpoint for discharge to SNF. GI and cardiology on board. Speech therapy t evaluated the patient and recommended regular diet. Advanced diet as tolerated. GI has a plan for elective EGD with esophageal dilatation outpatient. EDEN in the setting of IV diuresis and Zosyn use. Stopped  the Lasix and cardiology started the patient on normal saline 100/h for up to 500 mL. Continue Coreg and losartan. Avoid nephrotoxic agents. Resume home meds. Zosyn was discontinued and changed to p.o. antibiotics including amoxicillin and Flagyl. Monitored creatinine.   Monitored for any nephrotoxicity  Acute metabolic encephalopathy in the setting of

## 2023-05-04 NOTE — CARE COORDINATION
5/4 DAVID fuentes remains in Pending status per website. Electronically signed by Rodney Olivera RN on 5/4/2023 at 7:35 AM

## 2024-04-03 ENCOUNTER — HOSPITAL ENCOUNTER (INPATIENT)
Age: 84
LOS: 3 days | Discharge: NURSING, REHAB OR LONG-TERM CARE | End: 2024-04-06
Attending: HOSPITALIST | Admitting: HOSPITALIST
Payer: MEDICARE

## 2024-04-03 ENCOUNTER — APPOINTMENT (OUTPATIENT)
Dept: CT IMAGING | Age: 84
End: 2024-04-03
Payer: MEDICARE

## 2024-04-03 ENCOUNTER — APPOINTMENT (OUTPATIENT)
Dept: GENERAL RADIOLOGY | Age: 84
End: 2024-04-03
Payer: MEDICARE

## 2024-04-03 DIAGNOSIS — R53.1 GENERAL WEAKNESS: ICD-10-CM
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
## 2024-04-03 DIAGNOSIS — R29.6 FREQUENT FALLS: ICD-10-CM

## 2024-04-03 DIAGNOSIS — Z71.89 GOALS OF CARE, COUNSELING/DISCUSSION: ICD-10-CM

## 2024-04-03 DIAGNOSIS — R53.81 PHYSICAL DEBILITY: ICD-10-CM

## 2024-04-03 DIAGNOSIS — S20.219A CONTUSION OF CHEST WALL, UNSPECIFIED LATERALITY, INITIAL ENCOUNTER: ICD-10-CM

## 2024-04-03 LAB
ALBUMIN SERPL-MCNC: 3.6 G/DL (ref 3.4–5)
ALBUMIN/GLOB SERPL: 1.1 {RATIO} (ref 1.1–2.2)
ALP SERPL-CCNC: 72 U/L (ref 40–129)
ALT SERPL-CCNC: 18 U/L (ref 10–40)
ANION GAP SERPL CALCULATED.3IONS-SCNC: 10 MMOL/L (ref 3–16)
AST SERPL-CCNC: 27 U/L (ref 15–37)
BASOPHILS # BLD: 0 K/UL (ref 0–0.2)
BASOPHILS NFR BLD: 0.3 %
BILIRUB SERPL-MCNC: 0.8 MG/DL (ref 0–1)
BILIRUB UR QL STRIP.AUTO: NEGATIVE
BUN SERPL-MCNC: 23 MG/DL (ref 7–20)
CALCIUM SERPL-MCNC: 9.7 MG/DL (ref 8.3–10.6)
CHLORIDE SERPL-SCNC: 106 MMOL/L (ref 99–110)
CLARITY UR: CLEAR
CO2 SERPL-SCNC: 25 MMOL/L (ref 21–32)
COLOR UR: YELLOW
CREAT SERPL-MCNC: 1.1 MG/DL (ref 0.8–1.3)
DEPRECATED RDW RBC AUTO: 13.9 % (ref 12.4–15.4)
EKG ATRIAL RATE: 67 BPM
EKG DIAGNOSIS: NORMAL
EKG P-R INTERVAL: 176 MS
EKG Q-T INTERVAL: 460 MS
EKG QRS DURATION: 150 MS
EKG QTC CALCULATION (BAZETT): 486 MS
EKG R AXIS: 189 DEGREES
EKG T AXIS: 2 DEGREES
EKG VENTRICULAR RATE: 67 BPM
EOSINOPHIL # BLD: 0.2 K/UL (ref 0–0.6)
EOSINOPHIL NFR BLD: 2.1 %
FLUAV RNA UPPER RESP QL NAA+PROBE: NEGATIVE
FLUBV AG NPH QL: NEGATIVE
GFR SERPLBLD CREATININE-BSD FMLA CKD-EPI: 65 ML/MIN/{1.73_M2}
GLUCOSE SERPL-MCNC: 109 MG/DL (ref 70–99)
GLUCOSE UR STRIP.AUTO-MCNC: NEGATIVE MG/DL
HCT VFR BLD AUTO: 41.3 % (ref 40.5–52.5)
HGB BLD-MCNC: 14 G/DL (ref 13.5–17.5)
HGB UR QL STRIP.AUTO: NEGATIVE
KETONES UR STRIP.AUTO-MCNC: ABNORMAL MG/DL
LEUKOCYTE ESTERASE UR QL STRIP.AUTO: NEGATIVE
LYMPHOCYTES # BLD: 0.9 K/UL (ref 1–5.1)
LYMPHOCYTES NFR BLD: 10.3 %
MCH RBC QN AUTO: 31.5 PG (ref 26–34)
MCHC RBC AUTO-ENTMCNC: 33.9 G/DL (ref 31–36)
MCV RBC AUTO: 93 FL (ref 80–100)
MONOCYTES # BLD: 0.8 K/UL (ref 0–1.3)
MONOCYTES NFR BLD: 9 %
NEUTROPHILS # BLD: 7.1 K/UL (ref 1.7–7.7)
NEUTROPHILS NFR BLD: 78.3 %
NITRITE UR QL STRIP.AUTO: NEGATIVE
PH UR STRIP.AUTO: 5 [PH] (ref 5–8)
PLATELET # BLD AUTO: 147 K/UL (ref 135–450)
PMV BLD AUTO: 9.6 FL (ref 5–10.5)
POTASSIUM SERPL-SCNC: 4.1 MMOL/L (ref 3.5–5.1)
PROT SERPL-MCNC: 6.9 G/DL (ref 6.4–8.2)
PROT UR STRIP.AUTO-MCNC: NEGATIVE MG/DL
RBC # BLD AUTO: 4.44 M/UL (ref 4.2–5.9)
SARS-COV-2 RDRP RESP QL NAA+PROBE: NOT DETECTED
SODIUM SERPL-SCNC: 141 MMOL/L (ref 136–145)
SP GR UR STRIP.AUTO: 1.04 (ref 1–1.03)
TROPONIN, HIGH SENSITIVITY: 22 NG/L (ref 0–22)
TROPONIN, HIGH SENSITIVITY: 23 NG/L (ref 0–22)
UA COMPLETE W REFLEX CULTURE PNL UR: ABNORMAL
UA DIPSTICK W REFLEX MICRO PNL UR: ABNORMAL
URN SPEC COLLECT METH UR: ABNORMAL
UROBILINOGEN UR STRIP-ACNC: 0.2 E.U./DL
WBC # BLD AUTO: 9.1 K/UL (ref 4–11)

## 2024-04-03 PROCEDURE — 84484 ASSAY OF TROPONIN QUANT: CPT

## 2024-04-03 PROCEDURE — 93010 ELECTROCARDIOGRAM REPORT: CPT | Performed by: INTERNAL MEDICINE

## 2024-04-03 PROCEDURE — 81003 URINALYSIS AUTO W/O SCOPE: CPT

## 2024-04-03 PROCEDURE — 2580000003 HC RX 258: Performed by: HOSPITALIST

## 2024-04-03 PROCEDURE — 82746 ASSAY OF FOLIC ACID SERUM: CPT

## 2024-04-03 PROCEDURE — 96361 HYDRATE IV INFUSION ADD-ON: CPT

## 2024-04-03 PROCEDURE — 70450 CT HEAD/BRAIN W/O DYE: CPT

## 2024-04-03 PROCEDURE — 87804 INFLUENZA ASSAY W/OPTIC: CPT

## 2024-04-03 PROCEDURE — 2580000003 HC RX 258: Performed by: GENERAL ACUTE CARE HOSPITAL

## 2024-04-03 PROCEDURE — 82306 VITAMIN D 25 HYDROXY: CPT

## 2024-04-03 PROCEDURE — 6370000000 HC RX 637 (ALT 250 FOR IP): Performed by: REGISTERED NURSE

## 2024-04-03 PROCEDURE — 87635 SARS-COV-2 COVID-19 AMP PRB: CPT

## 2024-04-03 PROCEDURE — 6360000002 HC RX W HCPCS: Performed by: GENERAL ACUTE CARE HOSPITAL

## 2024-04-03 PROCEDURE — 6360000002 HC RX W HCPCS: Performed by: HOSPITALIST

## 2024-04-03 PROCEDURE — 96374 THER/PROPH/DIAG INJ IV PUSH: CPT

## 2024-04-03 PROCEDURE — 80053 COMPREHEN METABOLIC PANEL: CPT

## 2024-04-03 PROCEDURE — 93005 ELECTROCARDIOGRAM TRACING: CPT | Performed by: HOSPITALIST

## 2024-04-03 PROCEDURE — 36415 COLL VENOUS BLD VENIPUNCTURE: CPT

## 2024-04-03 PROCEDURE — 85025 COMPLETE CBC W/AUTO DIFF WBC: CPT

## 2024-04-03 PROCEDURE — 82607 VITAMIN B-12: CPT

## 2024-04-03 PROCEDURE — 1200000000 HC SEMI PRIVATE

## 2024-04-03 PROCEDURE — 71045 X-RAY EXAM CHEST 1 VIEW: CPT

## 2024-04-03 PROCEDURE — 99285 EMERGENCY DEPT VISIT HI MDM: CPT

## 2024-04-03 PROCEDURE — 96375 TX/PRO/DX INJ NEW DRUG ADDON: CPT

## 2024-04-03 PROCEDURE — 84443 ASSAY THYROID STIM HORMONE: CPT

## 2024-04-03 PROCEDURE — 6360000002 HC RX W HCPCS: Performed by: REGISTERED NURSE

## 2024-04-03 PROCEDURE — 6370000000 HC RX 637 (ALT 250 FOR IP): Performed by: HOSPITALIST

## 2024-04-03 PROCEDURE — 6360000004 HC RX CONTRAST MEDICATION: Performed by: GENERAL ACUTE CARE HOSPITAL

## 2024-04-03 PROCEDURE — 71260 CT THORAX DX C+: CPT

## 2024-04-03 RX ORDER — METHOCARBAMOL 500 MG/1
500 TABLET, FILM COATED ORAL 2 TIMES DAILY
Status: COMPLETED | OUTPATIENT
Start: 2024-04-03 | End: 2024-04-06

## 2024-04-03 RX ORDER — KETOROLAC TROMETHAMINE 30 MG/ML
30 INJECTION, SOLUTION INTRAMUSCULAR; INTRAVENOUS ONCE
Status: COMPLETED | OUTPATIENT
Start: 2024-04-03 | End: 2024-04-03

## 2024-04-03 RX ORDER — MORPHINE SULFATE 2 MG/ML
2 INJECTION, SOLUTION INTRAMUSCULAR; INTRAVENOUS ONCE
Status: COMPLETED | OUTPATIENT
Start: 2024-04-03 | End: 2024-04-03

## 2024-04-03 RX ORDER — ALBUTEROL SULFATE 2.5 MG/3ML
2.5 SOLUTION RESPIRATORY (INHALATION) EVERY 4 HOURS PRN
Status: DISCONTINUED | OUTPATIENT
Start: 2024-04-03 | End: 2024-04-06 | Stop reason: HOSPADM

## 2024-04-03 RX ORDER — MECLIZINE HCL 12.5 MG/1
12.5 TABLET ORAL 3 TIMES DAILY PRN
Status: DISCONTINUED | OUTPATIENT
Start: 2024-04-03 | End: 2024-04-06 | Stop reason: HOSPADM

## 2024-04-03 RX ORDER — SODIUM CHLORIDE 9 MG/ML
INJECTION, SOLUTION INTRAVENOUS CONTINUOUS
Status: DISCONTINUED | OUTPATIENT
Start: 2024-04-03 | End: 2024-04-04

## 2024-04-03 RX ORDER — ONDANSETRON 2 MG/ML
4 INJECTION INTRAMUSCULAR; INTRAVENOUS ONCE
Status: COMPLETED | OUTPATIENT
Start: 2024-04-03 | End: 2024-04-03

## 2024-04-03 RX ORDER — POTASSIUM CHLORIDE 20 MEQ/1
40 TABLET, EXTENDED RELEASE ORAL PRN
Status: DISCONTINUED | OUTPATIENT
Start: 2024-04-03 | End: 2024-04-06 | Stop reason: HOSPADM

## 2024-04-03 RX ORDER — 0.9 % SODIUM CHLORIDE 0.9 %
1000 INTRAVENOUS SOLUTION INTRAVENOUS ONCE
Status: DISCONTINUED | OUTPATIENT
Start: 2024-04-03 | End: 2024-04-03

## 2024-04-03 RX ORDER — ACETAMINOPHEN 325 MG/1
650 TABLET ORAL EVERY 6 HOURS PRN
Status: DISCONTINUED | OUTPATIENT
Start: 2024-04-03 | End: 2024-04-06 | Stop reason: HOSPADM

## 2024-04-03 RX ORDER — 0.9 % SODIUM CHLORIDE 0.9 %
500 INTRAVENOUS SOLUTION INTRAVENOUS ONCE
Status: COMPLETED | OUTPATIENT
Start: 2024-04-03 | End: 2024-04-03

## 2024-04-03 RX ORDER — SODIUM CHLORIDE 0.9 % (FLUSH) 0.9 %
10 SYRINGE (ML) INJECTION PRN
Status: DISCONTINUED | OUTPATIENT
Start: 2024-04-03 | End: 2024-04-06 | Stop reason: HOSPADM

## 2024-04-03 RX ORDER — ENOXAPARIN SODIUM 100 MG/ML
30 INJECTION SUBCUTANEOUS 2 TIMES DAILY
Status: DISCONTINUED | OUTPATIENT
Start: 2024-04-03 | End: 2024-04-06 | Stop reason: HOSPADM

## 2024-04-03 RX ORDER — SODIUM CHLORIDE 0.9 % (FLUSH) 0.9 %
5-40 SYRINGE (ML) INJECTION EVERY 12 HOURS SCHEDULED
Status: DISCONTINUED | OUTPATIENT
Start: 2024-04-03 | End: 2024-04-06 | Stop reason: HOSPADM

## 2024-04-03 RX ORDER — POTASSIUM CHLORIDE 7.45 MG/ML
10 INJECTION INTRAVENOUS PRN
Status: DISCONTINUED | OUTPATIENT
Start: 2024-04-03 | End: 2024-04-06 | Stop reason: HOSPADM

## 2024-04-03 RX ORDER — ASPIRIN 81 MG/1
81 TABLET ORAL DAILY
Status: DISCONTINUED | OUTPATIENT
Start: 2024-04-04 | End: 2024-04-06 | Stop reason: HOSPADM

## 2024-04-03 RX ORDER — ACETAMINOPHEN 650 MG/1
650 SUPPOSITORY RECTAL EVERY 6 HOURS PRN
Status: DISCONTINUED | OUTPATIENT
Start: 2024-04-03 | End: 2024-04-06 | Stop reason: HOSPADM

## 2024-04-03 RX ORDER — ONDANSETRON 2 MG/ML
4 INJECTION INTRAMUSCULAR; INTRAVENOUS EVERY 6 HOURS PRN
Status: DISCONTINUED | OUTPATIENT
Start: 2024-04-03 | End: 2024-04-06 | Stop reason: HOSPADM

## 2024-04-03 RX ORDER — LOSARTAN POTASSIUM 25 MG/1
50 TABLET ORAL DAILY
Status: DISCONTINUED | OUTPATIENT
Start: 2024-04-04 | End: 2024-04-06 | Stop reason: HOSPADM

## 2024-04-03 RX ORDER — SODIUM CHLORIDE 9 MG/ML
INJECTION, SOLUTION INTRAVENOUS PRN
Status: DISCONTINUED | OUTPATIENT
Start: 2024-04-03 | End: 2024-04-06 | Stop reason: HOSPADM

## 2024-04-03 RX ORDER — ONDANSETRON 4 MG/1
4 TABLET, ORALLY DISINTEGRATING ORAL EVERY 8 HOURS PRN
Status: DISCONTINUED | OUTPATIENT
Start: 2024-04-03 | End: 2024-04-06 | Stop reason: HOSPADM

## 2024-04-03 RX ORDER — DORZOLAMIDE HYDROCHLORIDE AND TIMOLOL MALEATE 20; 5 MG/ML; MG/ML
1 SOLUTION/ DROPS OPHTHALMIC 2 TIMES DAILY
Status: DISCONTINUED | OUTPATIENT
Start: 2024-04-03 | End: 2024-04-06 | Stop reason: HOSPADM

## 2024-04-03 RX ORDER — POLYETHYLENE GLYCOL 3350 17 G/17G
17 POWDER, FOR SOLUTION ORAL DAILY PRN
Status: DISCONTINUED | OUTPATIENT
Start: 2024-04-03 | End: 2024-04-06 | Stop reason: HOSPADM

## 2024-04-03 RX ORDER — CARVEDILOL 6.25 MG/1
6.25 TABLET ORAL 2 TIMES DAILY WITH MEALS
Status: DISCONTINUED | OUTPATIENT
Start: 2024-04-04 | End: 2024-04-06 | Stop reason: HOSPADM

## 2024-04-03 RX ADMIN — DORZOLAMIDE HYDROCHLORIDE AND TIMOLOL MALEATE 1 DROP: 20; 5 SOLUTION/ DROPS OPHTHALMIC at 23:32

## 2024-04-03 RX ADMIN — IOPAMIDOL 75 ML: 755 INJECTION, SOLUTION INTRAVENOUS at 11:54

## 2024-04-03 RX ADMIN — SODIUM CHLORIDE 500 ML: 9 INJECTION, SOLUTION INTRAVENOUS at 11:38

## 2024-04-03 RX ADMIN — KETOROLAC TROMETHAMINE 30 MG: 30 INJECTION, SOLUTION INTRAMUSCULAR at 22:36

## 2024-04-03 RX ADMIN — SODIUM CHLORIDE, PRESERVATIVE FREE 10 ML: 5 INJECTION INTRAVENOUS at 22:37

## 2024-04-03 RX ADMIN — SODIUM CHLORIDE: 9 INJECTION, SOLUTION INTRAVENOUS at 22:44

## 2024-04-03 RX ADMIN — METHOCARBAMOL 500 MG: 500 TABLET ORAL at 22:36

## 2024-04-03 RX ADMIN — MORPHINE SULFATE 2 MG: 2 INJECTION, SOLUTION INTRAMUSCULAR; INTRAVENOUS at 11:39

## 2024-04-03 RX ADMIN — ONDANSETRON 4 MG: 2 INJECTION INTRAMUSCULAR; INTRAVENOUS at 11:39

## 2024-04-03 ASSESSMENT — PAIN - FUNCTIONAL ASSESSMENT
PAIN_FUNCTIONAL_ASSESSMENT: NONE - DENIES PAIN
PAIN_FUNCTIONAL_ASSESSMENT: ACTIVITIES ARE NOT PREVENTED

## 2024-04-03 ASSESSMENT — ENCOUNTER SYMPTOMS
CHEST TIGHTNESS: 0
SORE THROAT: 0
ABDOMINAL PAIN: 0
COUGH: 0
WHEEZING: 0
SHORTNESS OF BREATH: 0
NAUSEA: 0
VOMITING: 0
BACK PAIN: 0
BLOOD IN STOOL: 0
VOICE CHANGE: 0

## 2024-04-03 ASSESSMENT — PAIN DESCRIPTION - LOCATION: LOCATION: GENERALIZED

## 2024-04-03 ASSESSMENT — PAIN SCALES - GENERAL
PAINLEVEL_OUTOF10: 6
PAINLEVEL_OUTOF10: 6

## 2024-04-03 ASSESSMENT — HEART SCORE
ECG: NORMAL
ECG: NORMAL

## 2024-04-03 ASSESSMENT — PAIN DESCRIPTION - DESCRIPTORS: DESCRIPTORS: SHARP

## 2024-04-03 ASSESSMENT — LIFESTYLE VARIABLES
HOW OFTEN DO YOU HAVE A DRINK CONTAINING ALCOHOL: NEVER
HOW MANY STANDARD DRINKS CONTAINING ALCOHOL DO YOU HAVE ON A TYPICAL DAY: PATIENT DOES NOT DRINK
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
## 2024-04-03 NOTE — CARE COORDINATION
Case Management Assessment  Initial Evaluation    Date/Time of Evaluation: 4/3/2024 5:59 PM  Assessment Completed by: FILOMENA Dixon, JERMAINW    If patient is discharged prior to next notation, then this note serves as note for discharge by case management.    Patient Name: Elliot Woodward                   YOB: 1940  Diagnosis: General weakness [R53.1]                   Date / Time: 4/3/2024 10:08 AM    Patient Admission Status: Inpatient   Readmission Risk (Low < 19, Mod (19-27), High > 27): Readmission Risk Score: 10.6    Current PCP: Samantha Garrison  PCP verified by CM? Yes    Chart Reviewed: Yes      History Provided by: Patient  Patient Orientation: Alert and Oriented    Patient Cognition: Alert    Hospitalization in the last 30 days (Readmission):  No    If yes, Readmission Assessment in CM Navigator will be completed.    Advance Directives:      Code Status: Prior   Patient's Primary Decision Maker is: Legal Next of Kin    Primary Decision Maker: Liam Woodward - Child - [phone]    Primary Decision Maker: Charlette Woodward - Child - [phone]    Discharge Planning:    Patient lives with: Alone, Other (Comment) (has one dog.) Type of Home: Trailer/Mobile Home  Primary Care Giver: Self  Patient Support Systems include: Children, Family Members   Current Financial resources: Medicare  Current community resources: None  Current services prior to admission: None, Durable Medical Equipment, Other (Comment) ([facility] on aging)            Current DME:              Type of Home Care services:  None    ADLS  Prior functional level: Independent in ADLs/IADLs  Current functional level: Other (see comment) (TBD by therapies.)    PT AM-PAC:   /24  OT AM-PAC:   /24    Family can provide assistance at DC: Yes  Would you like Case Management to discuss the discharge plan with any other family members/significant others, and if so, who? Yes (son Liam if needed.)  Plans to Return to Present Housing:

## 2024-04-03 NOTE — ED NOTES
RN spoke to pt in regards to need a urine specimen. Pt stating that he does not want to be straight cathed. Family at bedside. RN informed pt that she will bk as soon as the fluids are done to see if pt can void then. Pt agreed that he will try then. No needs voiced at this time.

## 2024-04-03 NOTE — CARE COORDINATION
Advance Care Planning     Advance Care Planning Activator (Inpatient)  Conversation Note      Date of ACP Conversation: 4/3/2024     Conversation Conducted with: Patient with Decision Making Capacity    ACP Activator: Elaine Wu MSW, W    Health Care Decision Maker:     Current Designated Health Care Decision Maker:     Primary Decision Maker: Liam Woodward - Child - [phone]    Primary Decision Maker: Charlette Woodward - Child - [phone]    Care Preferences    Ventilation:  \"If you were in your present state of health and suddenly became very ill and were unable to breathe on your own, what would your preference be about the use of a ventilator (breathing machine) if it were available to you?\"      Would the patient desire the use of ventilator (breathing machine)?: yes    \"If your health worsens and it becomes clear that your chance of recovery is unlikely, what would your preference be about the use of a ventilator (breathing machine) if it were available to you?\"     Would the patient desire the use of ventilator (breathing machine)?: No      Resuscitation  \"CPR works best to restart the heart when there is a sudden event, like a heart attack, in someone who is otherwise healthy. Unfortunately, CPR does not typically restart the heart for people who have serious health conditions or who are very sick.\"    \"In the event your heart stopped as a result of an underlying serious health condition, would you want attempts to be made to restart your heart (answer \"yes\" for attempt to resuscitate) or would you prefer a natural death (answer \"no\" for do not attempt to resuscitate)?\" yes       [] Yes   [] No   Educated Patient / Decision Maker regarding differences between Advance Directives and portable DNR orders.    Length of ACP Conversation in minutes:  2    Conversation Outcomes:  ACP discussion completed    Follow-up plan:    [] Schedule follow-up conversation to continue planning  [] Referred individual  to Provider for additional questions/concerns   [] Advised patient/agent/surrogate to review completed ACP document and update if needed with changes in condition, patient preferences or care setting    [x] This note routed to one or more involved healthcare providers Samantha Garrison primary care physician.     Respectfully submitted,    Elaine BUTT, DANA  [facility]   [phone]    Electronically signed by FILOMENA Dixon, THUAN on 4/3/2024 at 6:00 PM

## 2024-04-03 NOTE — PROGRESS NOTES
Medication Reconciliation    List of medications patient is currently taking is complete.     Source of information: 1. Conversation with patient at bedside                                      2. EPIC records        Notes regarding home medications:   1. Patient reports not taking any medication this morning PTA      Ej Arvizu [facility]   4/3/2024  1:50 PM
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
## 2024-04-03 NOTE — ED NOTES
Pt returned from CT, son at bedside.RN attempted to help patient with urinal however pt unable to void. Pt stating that he would like some oral fluids to help him go the restroom. RN made Yessica NP aware. No oral fluids allowed at this time. Pt made aware.
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
## 2024-04-04 LAB
25(OH)D3 SERPL-MCNC: 20.3 NG/ML
BASOPHILS # BLD: 0 K/UL (ref 0–0.2)
BASOPHILS NFR BLD: 0.6 %
DEPRECATED RDW RBC AUTO: 14.2 % (ref 12.4–15.4)
EOSINOPHIL # BLD: 0.3 K/UL (ref 0–0.6)
EOSINOPHIL NFR BLD: 3.3 %
FOLATE SERPL-MCNC: >20 NG/ML (ref 4.78–24.2)
HCT VFR BLD AUTO: 38.1 % (ref 40.5–52.5)
HGB BLD-MCNC: 13 G/DL (ref 13.5–17.5)
LYMPHOCYTES # BLD: 1.2 K/UL (ref 1–5.1)
LYMPHOCYTES NFR BLD: 14.8 %
MCH RBC QN AUTO: 31.6 PG (ref 26–34)
MCHC RBC AUTO-ENTMCNC: 34 G/DL (ref 31–36)
MCV RBC AUTO: 92.9 FL (ref 80–100)
MONOCYTES # BLD: 0.7 K/UL (ref 0–1.3)
MONOCYTES NFR BLD: 8.9 %
NEUTROPHILS # BLD: 5.9 K/UL (ref 1.7–7.7)
NEUTROPHILS NFR BLD: 72.4 %
PLATELET # BLD AUTO: 146 K/UL (ref 135–450)
PMV BLD AUTO: 9.4 FL (ref 5–10.5)
RBC # BLD AUTO: 4.11 M/UL (ref 4.2–5.9)
TSH SERPL DL<=0.005 MIU/L-ACNC: 2.68 UIU/ML (ref 0.27–4.2)
VIT B12 SERPL-MCNC: 460 PG/ML (ref 211–911)
WBC # BLD AUTO: 8.1 K/UL (ref 4–11)

## 2024-04-04 PROCEDURE — 36415 COLL VENOUS BLD VENIPUNCTURE: CPT

## 2024-04-04 PROCEDURE — 2580000003 HC RX 258: Performed by: HOSPITALIST

## 2024-04-04 PROCEDURE — 6360000002 HC RX W HCPCS: Performed by: HOSPITALIST

## 2024-04-04 PROCEDURE — 97166 OT EVAL MOD COMPLEX 45 MIN: CPT

## 2024-04-04 PROCEDURE — 1200000000 HC SEMI PRIVATE

## 2024-04-04 PROCEDURE — 97530 THERAPEUTIC ACTIVITIES: CPT

## 2024-04-04 PROCEDURE — 6370000000 HC RX 637 (ALT 250 FOR IP): Performed by: REGISTERED NURSE

## 2024-04-04 PROCEDURE — 6370000000 HC RX 637 (ALT 250 FOR IP): Performed by: HOSPITALIST

## 2024-04-04 PROCEDURE — 9990000010 HC NO CHARGE VISIT

## 2024-04-04 PROCEDURE — 85025 COMPLETE CBC W/AUTO DIFF WBC: CPT

## 2024-04-04 PROCEDURE — 97162 PT EVAL MOD COMPLEX 30 MIN: CPT

## 2024-04-04 RX ORDER — OXYCODONE HYDROCHLORIDE 5 MG/1
2.5 TABLET ORAL EVERY 4 HOURS PRN
Status: DISCONTINUED | OUTPATIENT
Start: 2024-04-04 | End: 2024-04-04

## 2024-04-04 RX ORDER — IBUPROFEN 400 MG/1
200 TABLET ORAL ONCE
Status: DISCONTINUED | OUTPATIENT
Start: 2024-04-04 | End: 2024-04-06 | Stop reason: HOSPADM

## 2024-04-04 RX ADMIN — ASPIRIN 81 MG: 81 TABLET, COATED ORAL at 10:42

## 2024-04-04 RX ADMIN — LOSARTAN POTASSIUM 50 MG: 25 TABLET, FILM COATED ORAL at 10:41

## 2024-04-04 RX ADMIN — CARVEDILOL 6.25 MG: 6.25 TABLET, FILM COATED ORAL at 18:36

## 2024-04-04 RX ADMIN — OXYCODONE 2.5 MG: 5 TABLET ORAL at 03:09

## 2024-04-04 RX ADMIN — SODIUM CHLORIDE, PRESERVATIVE FREE 10 ML: 5 INJECTION INTRAVENOUS at 20:16

## 2024-04-04 RX ADMIN — DORZOLAMIDE HYDROCHLORIDE AND TIMOLOL MALEATE 1 DROP: 20; 5 SOLUTION/ DROPS OPHTHALMIC at 10:34

## 2024-04-04 RX ADMIN — ENOXAPARIN SODIUM 30 MG: 100 INJECTION SUBCUTANEOUS at 20:10

## 2024-04-04 RX ADMIN — SERTRALINE 50 MG: 50 TABLET, FILM COATED ORAL at 10:42

## 2024-04-04 RX ADMIN — CARVEDILOL 6.25 MG: 6.25 TABLET, FILM COATED ORAL at 10:45

## 2024-04-04 RX ADMIN — DORZOLAMIDE HYDROCHLORIDE AND TIMOLOL MALEATE 1 DROP: 20; 5 SOLUTION/ DROPS OPHTHALMIC at 20:32

## 2024-04-04 RX ADMIN — ENOXAPARIN SODIUM 30 MG: 100 INJECTION SUBCUTANEOUS at 10:40

## 2024-04-04 RX ADMIN — METHOCARBAMOL 500 MG: 500 TABLET ORAL at 20:09

## 2024-04-04 RX ADMIN — METHOCARBAMOL 500 MG: 500 TABLET ORAL at 10:43

## 2024-04-04 RX ADMIN — ACETAMINOPHEN 325MG 650 MG: 325 TABLET ORAL at 02:58

## 2024-04-04 ASSESSMENT — PAIN DESCRIPTION - DESCRIPTORS: DESCRIPTORS: SHARP

## 2024-04-04 ASSESSMENT — PAIN - FUNCTIONAL ASSESSMENT: PAIN_FUNCTIONAL_ASSESSMENT: ACTIVITIES ARE NOT PREVENTED

## 2024-04-04 ASSESSMENT — PAIN DESCRIPTION - LOCATION: LOCATION: GENERALIZED

## 2024-04-04 ASSESSMENT — PAIN SCALES - GENERAL: PAINLEVEL_OUTOF10: 10

## 2024-04-04 NOTE — PROGRESS NOTES
Admitted Patient to room 4270 from the emergency room. Patient arrived via stretcher. VS recorded. Patient A&O X4 Patients breathing regular and unlabored.  Oriented to room, call light, TV, phone, and patients rights and responsibilities. Bed in lowest position and locked, exit alarm in place. ID bracelet on and correct per patient verbally reporting name and date of birth. Call light within reach. Needed items within reach. No further needs at this time.

## 2024-04-04 NOTE — PROGRESS NOTES
4 Eyes Skin Assessment     NAME:  Elliot Woodward  YOB: 1940  MEDICAL RECORD NUMBER:  [id number]    The patient is being assessed for  Admission    I agree that at least one RN has performed a thorough Head to Toe Skin Assessment on the patient. ALL assessment sites listed below have been assessed.      Areas assessed by both nurses:    Head, Face, Ears, Shoulders, Back, Chest, Arms, Elbows, Hands, Sacrum. Buttock, Coccyx, Ischium, Legs. Feet and Heels, and Under Medical Devices         Does the Patient have a Wound? No noted wound(s)       Wan Prevention initiated by RN: Yes  Wound Care Orders initiated by RN: No    Pressure Injury (Stage 3,4, Unstageable, DTI, NWPT, and Complex wounds) if present, place Wound referral order by RN under : No    New Ostomies, if present place, Ostomy referral order under : No     Nurse 1 eSignature: Electronically signed by Chiki Wyatt RN on 4/4/24 at 4:12 AM EDT    **SHARE this note so that the co-signing nurse can place an eSignature**    Nurse 2 eSignature: Electronically signed by Elliot Bowser RN on 4/4/24 at 4:33 AM EDT

## 2024-04-04 NOTE — PROGRESS NOTES
Physical Therapy  Initial Evaluation Attempt    24    Name: Elliot Woodward   : 1940    MRN: [id number]    PT order noted. Patient refusing therapy, \"I'm not doing any therapy right now, I'm eating breakfast\". Will follow later.    Electronically signed by Héctor Almeida, PT on 2024 at 8:15 AM

## 2024-04-04 NOTE — PROGRESS NOTES
Pt sitting at edge of the bed. Pt falling asleep  while sitting at the edge of the bed. Refusing to get back in bed also refusing assist to get back in bed. Pt education performed on the risk of possible fall. Pt states that \"we are not helping him, all were doing is causing him more pain.\"  Charge Nurse called into room. Pt states that, \"if you're the boss you're not helping, you can leave.\" Pt education performed, Bed in lowest position, and locked.     Pt complaining  of pain. New orders for toradol, Robaxin, and oxycodone 2.5 mg were placed by Bhargav Fuller NP. Pt specifically wants Ibuprofen. Alina Pollard NP made aware. Orders for Ibuprofen placed. Pt Cr. 1.1. Pt  education performed on how ibuprofen can be harmful to him.  Pt aggressively tells this RN to go away.     Pt call light within reach. All needed items within reach.

## 2024-04-04 NOTE — CARE COORDINATION
Chart review done, nursing rounds completed. Pt is from home, alone, PT/OT saw and recommended home with HC if wanted, but not required or needed.     Will follow for needs, likely a couple days before dc readiness.     Kaushal Ovalles LMSW, [facility] Social Work Case Management   Phone: [phone]  Fax: [phone]
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
## 2024-04-04 NOTE — PROGRESS NOTES
Hospitalist Progress Note      PCP: Samantha Garrison    Date of Admission: 4/3/2024    Subjective: feeling slightly better today, denies any CP/SOB    Medications:  Reviewed    Infusion Medications    sodium chloride 100 mL/hr at 04/03/24 2244    sodium chloride       Scheduled Medications    ibuprofen  200 mg Oral Once    aspirin  81 mg Oral Daily    carvedilol  6.25 mg Oral BID WC    dorzolamide-timolol  1 drop Both Eyes BID    losartan  50 mg Oral Daily    netarsudil-Latanoprost  1 drop Both Eyes QPM    sertraline  50 mg Oral Daily    sodium chloride flush  5-40 mL IntraVENous 2 times per day    enoxaparin  30 mg SubCUTAneous BID    methocarbamol  500 mg Oral BID     PRN Meds: albuterol, meclizine, sodium chloride flush, sodium chloride, potassium chloride **OR** potassium alternative oral replacement **OR** potassium chloride, ondansetron **OR** ondansetron, polyethylene glycol, acetaminophen **OR** acetaminophen    No intake or output data in the 24 hours ending 04/04/24 4673    Physical Exam Performed:    /71   Pulse 58   Temp 98.2 °F (36.8 °C) (Oral)   Resp 18   Ht 1.702 m (5' 7\")   Wt 101 kg (222 lb 10.6 oz)   SpO2 92%   BMI 34.87 kg/m²     Gen:          Alert and oriented x 3   Resp: No accessory muscle use. No crackles. No wheezes. No rhonchi.  CV: Regular rate. Regular rhythm. No murmur or rub. No edema.   GI: Non-tender. Non-distended. No hernia.   Skin: Warm, dry, normal texture and turgor.   Lymph: No cervical LAD. No supraclavicular LAD.   M/S: / Ext. No cyanosis. No clubbing. No joint deformity.    Neuro: Moves all four extremities. CN 2-12 tested, no deficits noted.  Peripheral pulses and capillary refill is intact.    Labs:   Recent Labs     04/03/24  1030 04/04/24  0647   WBC 9.1 8.1   HGB 14.0 13.0*   HCT 41.3 38.1*    146     Recent Labs     04/03/24  1030      K 4.1      CO2 25   BUN 23*   CREATININE 1.1   CALCIUM 9.7     Recent Labs     04/03/24  1030    AST 27   ALT 18   BILITOT 0.8   ALKPHOS 72     No results for input(s): \"INR\" in the last 72 hours.  Recent Labs     04/03/24  1030 04/03/24  1135   TROPHS 22 23*       Urinalysis:      Lab Results   Component Value Date/Time    NITRU Negative 04/03/2024 01:30 PM    BLOODU Negative 04/03/2024 01:30 PM    SPECGRAV 1.044 04/03/2024 01:30 PM    GLUCOSEU Negative 04/03/2024 01:30 PM       Radiology:  CT CHEST ABDOMEN PELVIS W CONTRAST Additional Contrast? None   Final Result   No acute thoracic/abdominopelvic abnormality.         CT HEAD WO CONTRAST   Final Result   No evidence of acute intracranial abnormality.         XR CHEST PORTABLE   Final Result   Mild cardiac enlargement.      Left lower lung zone opacity silhouetting the diaphragm may represent   effusion, atelectasis, infiltrate or combination.             IP CONSULT TO HOSPITALIST    Assessment/Plan:    Active Hospital Problems    Diagnosis     General weakness [R53.1]      Generalized weakness  No apparent etiology  PT/OT eval done  Check TSH vitamin B12/folate level normal       Left lower lung zone opacity  Check CT scan of the chest/abd/pelvis - no acute abnormality     Hypertension - controlled  Continue on home medication      Diet: ADULT DIET; Regular; No Added Salt (3-4 gm); 2000 ml  Code Status: Full Code  PT/OT Eval Status: ordered    Dispo - cont care, poss dc in am      Lanette Velasquez MD
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
## 2024-04-05 LAB
ANION GAP SERPL CALCULATED.3IONS-SCNC: 8 MMOL/L (ref 3–16)
BASOPHILS # BLD: 0 K/UL (ref 0–0.2)
BASOPHILS NFR BLD: 0.4 %
BUN SERPL-MCNC: 26 MG/DL (ref 7–20)
CALCIUM SERPL-MCNC: 8.8 MG/DL (ref 8.3–10.6)
CHLORIDE SERPL-SCNC: 107 MMOL/L (ref 99–110)
CO2 SERPL-SCNC: 25 MMOL/L (ref 21–32)
CREAT SERPL-MCNC: 1 MG/DL (ref 0.8–1.3)
DEPRECATED RDW RBC AUTO: 14 % (ref 12.4–15.4)
EOSINOPHIL # BLD: 0.4 K/UL (ref 0–0.6)
EOSINOPHIL NFR BLD: 5.4 %
GFR SERPLBLD CREATININE-BSD FMLA CKD-EPI: 73 ML/MIN/{1.73_M2}
GLUCOSE SERPL-MCNC: 116 MG/DL (ref 70–99)
HCT VFR BLD AUTO: 36.6 % (ref 40.5–52.5)
HGB BLD-MCNC: 12.4 G/DL (ref 13.5–17.5)
LYMPHOCYTES # BLD: 0.9 K/UL (ref 1–5.1)
LYMPHOCYTES NFR BLD: 13.2 %
MCH RBC QN AUTO: 31.4 PG (ref 26–34)
MCHC RBC AUTO-ENTMCNC: 33.9 G/DL (ref 31–36)
MCV RBC AUTO: 92.7 FL (ref 80–100)
MONOCYTES # BLD: 0.7 K/UL (ref 0–1.3)
MONOCYTES NFR BLD: 9.1 %
NEUTROPHILS # BLD: 5.2 K/UL (ref 1.7–7.7)
NEUTROPHILS NFR BLD: 71.9 %
PLATELET # BLD AUTO: 131 K/UL (ref 135–450)
PMV BLD AUTO: 9 FL (ref 5–10.5)
POTASSIUM SERPL-SCNC: 4.3 MMOL/L (ref 3.5–5.1)
RBC # BLD AUTO: 3.95 M/UL (ref 4.2–5.9)
SODIUM SERPL-SCNC: 140 MMOL/L (ref 136–145)
WBC # BLD AUTO: 7.2 K/UL (ref 4–11)

## 2024-04-05 PROCEDURE — 97530 THERAPEUTIC ACTIVITIES: CPT

## 2024-04-05 PROCEDURE — 1200000000 HC SEMI PRIVATE

## 2024-04-05 PROCEDURE — 36415 COLL VENOUS BLD VENIPUNCTURE: CPT

## 2024-04-05 PROCEDURE — 80048 BASIC METABOLIC PNL TOTAL CA: CPT

## 2024-04-05 PROCEDURE — 97535 SELF CARE MNGMENT TRAINING: CPT

## 2024-04-05 PROCEDURE — 6370000000 HC RX 637 (ALT 250 FOR IP): Performed by: INTERNAL MEDICINE

## 2024-04-05 PROCEDURE — 6370000000 HC RX 637 (ALT 250 FOR IP): Performed by: HOSPITALIST

## 2024-04-05 PROCEDURE — 94760 N-INVAS EAR/PLS OXIMETRY 1: CPT

## 2024-04-05 PROCEDURE — 6360000002 HC RX W HCPCS: Performed by: HOSPITALIST

## 2024-04-05 PROCEDURE — 85025 COMPLETE CBC W/AUTO DIFF WBC: CPT

## 2024-04-05 PROCEDURE — 2580000003 HC RX 258: Performed by: HOSPITALIST

## 2024-04-05 PROCEDURE — 6370000000 HC RX 637 (ALT 250 FOR IP): Performed by: REGISTERED NURSE

## 2024-04-05 RX ORDER — METHOCARBAMOL 500 MG/1
500 TABLET, FILM COATED ORAL 2 TIMES DAILY
Qty: 2 TABLET | Refills: 0 | Status: SHIPPED | OUTPATIENT
Start: 2024-04-05 | End: 2024-04-06

## 2024-04-05 RX ADMIN — SERTRALINE 50 MG: 50 TABLET, FILM COATED ORAL at 08:28

## 2024-04-05 RX ADMIN — Medication 1 TABLET: at 21:23

## 2024-04-05 RX ADMIN — Medication 1 TABLET: at 12:14

## 2024-04-05 RX ADMIN — CARVEDILOL 6.25 MG: 6.25 TABLET, FILM COATED ORAL at 16:21

## 2024-04-05 RX ADMIN — SODIUM CHLORIDE, PRESERVATIVE FREE 10 ML: 5 INJECTION INTRAVENOUS at 20:48

## 2024-04-05 RX ADMIN — ENOXAPARIN SODIUM 30 MG: 100 INJECTION SUBCUTANEOUS at 21:24

## 2024-04-05 RX ADMIN — METHOCARBAMOL 500 MG: 500 TABLET ORAL at 21:23

## 2024-04-05 RX ADMIN — ASPIRIN 81 MG: 81 TABLET, COATED ORAL at 08:28

## 2024-04-05 RX ADMIN — SODIUM CHLORIDE, PRESERVATIVE FREE 10 ML: 5 INJECTION INTRAVENOUS at 08:32

## 2024-04-05 RX ADMIN — CARVEDILOL 6.25 MG: 6.25 TABLET, FILM COATED ORAL at 08:28

## 2024-04-05 RX ADMIN — LOSARTAN POTASSIUM 50 MG: 25 TABLET, FILM COATED ORAL at 08:27

## 2024-04-05 RX ADMIN — ACETAMINOPHEN 325MG 650 MG: 325 TABLET ORAL at 16:24

## 2024-04-05 RX ADMIN — METHOCARBAMOL 500 MG: 500 TABLET ORAL at 08:27

## 2024-04-05 RX ADMIN — DORZOLAMIDE HYDROCHLORIDE AND TIMOLOL MALEATE 1 DROP: 20; 5 SOLUTION/ DROPS OPHTHALMIC at 08:23

## 2024-04-05 RX ADMIN — DORZOLAMIDE HYDROCHLORIDE AND TIMOLOL MALEATE 1 DROP: 20; 5 SOLUTION/ DROPS OPHTHALMIC at 21:26

## 2024-04-05 ASSESSMENT — PAIN DESCRIPTION - DESCRIPTORS: DESCRIPTORS: SORE

## 2024-04-05 ASSESSMENT — PAIN DESCRIPTION - ORIENTATION: ORIENTATION: RIGHT

## 2024-04-05 ASSESSMENT — PAIN SCALES - GENERAL: PAINLEVEL_OUTOF10: 5

## 2024-04-05 ASSESSMENT — PAIN DESCRIPTION - LOCATION: LOCATION: CHEST

## 2024-04-05 NOTE — DISCHARGE INSTR - DIET
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
## 2024-04-05 NOTE — CARE COORDINATION
SW met with pt and family in room, who did not feel comfortable with pt going home alone, as he has frequent falls, not able to get up off the floor alone, and slides out of bed, chair or couch. PT/OT rec initially for Home with HC- [facility]. COA referral placed.     Family did not want to take home, until they could see how pt worked with therapy. They came to reassess, and pt cannot get out of bed on own.   PT/OT rec for SNF. Pt wanted [facility], referral placed, they are able to accept. Precert pending.   HENS done, in case the precert is approved, in the event it is not, then pt will go home with HC, and COA to follow.       Kaushal Ovalles LMSW, [facility] Social Work Case Management   Phone: [phone]  Fax: [phone]

## 2024-04-05 NOTE — PROGRESS NOTES
24 1712   Encounter Summary   Encounter Overview/Reason  Initial Encounter   Service Provided For: Patient   Referral/Consult From: [facility]   Support System Children  (Wife is .)   Last Encounter  24  (Visited with patient at bedside. He was sitting in a chair.)   Complexity of Encounter Low   Begin Time 1700   End Time  1715   Total Time Calculated 15 min   Spiritual/Emotional needs   Type Spiritual Support  (Patient endorses being Pentecostal.)   Rituals, Rites and Sacraments   Type Blessings  (Offered prayer of blessing for patient's recovery and homegoing.)   Assessment/Intervention/Outcome   Assessment Calm;Coping;Hopeful;Peaceful   Intervention Active listening;Discussed belief system/Faith practices/jefry;Prayer (assurance of)/[place];Sustaining Presence/Ministry of presence   Outcome Comfort;Coping;Encouraged;Engaged in conversation;Optimistic;Peace;Receptive   Plan and Referrals   Plan/Referrals No future visits requested  (F/U PRN as requested by patient, his family, or staff.)

## 2024-04-05 NOTE — CARE COORDINATION
OUR [facility]  PRE-CERT REQUEST SUBMITTED VIA ACCESS PORTAL    [facility]    ANTICIPATED ADMIT DATE TO [facility]:  4/5/24    OUR [facility] #:  [id number]       Jana Wadsworth Sr. Administrative Assist, Case Management  [phone]  Electronically signed by Jana Wadsworth on 4/5/2024 at 3:17 PM

## 2024-04-05 NOTE — DISCHARGE INSTR - COC
Continuity of Care Form    Patient Name: Elliot Woodward   :  1940  MRN:  [id number]    Admit date:  4/3/2024  Discharge date:  24    Code Status Order: Full Code   Advance Directives:     Admitting Physician:  Alfonso Campos MD  PCP: Samantha Garrison    Discharging Nurse: Jean RiggsRN  Discharging Hospital Unit/Room#: G8E-3894/4270-01  Discharging Unit Phone Number: [phone]  Emergency Contact:   Extended Emergency Contact Information  Primary Emergency Contact: Liam Woodward  Home Phone: [phone]  Mobile Phone: [phone]  Relation: Child  Preferred language: English   needed? No  Secondary Emergency Contact: Charlette Woodward  Home Phone: [phone]  Mobile Phone: [phone]  Relation: Child  Preferred language: English    Past Surgical History:  Past Surgical History:   Procedure Laterality Date    CARDIAC DEFIBRILLATOR PLACEMENT      CYSTOSCOPY      for kidney stones    OTHER SURGICAL HISTORY      excision scalp basil cell    PACEMAKER INSERTION  1986    PACEMAKER INSERTION      with defibrillator    PRE-MALIGNANT / BENIGN SKIN LESION EXCISION         Immunization History:     There is no immunization history on file for this patient.    Active Problems:  Patient Active Problem List   Diagnosis Code    S/P ICD (internal cardiac defibrillator) procedure Z95.810    Non morbid obesity due to excess calories E66.09    Primary hypertension I10    Heart block I45.9    Vertigo R42    Skin cancer, basal cell (on head) - NBCC with focal metatypical change -SCALP 2011, NBCC, pigmented - R temple 13  C44.91    Congestive cardiomyopathy (HCC) I42.0    H/o Sinoatrial node dysfunction (HCC) I49.5    H/o Paroxysmal ventricular tachycardia (HCC) I47.29    Mitral valve insufficiency I34.0    Hyperlipidemia E78.5    Acute respiratory failure with hypoxia (HCC) J96.01    Elevated troponin I level R79.89    Difficulty walking R26.2    Pharyngitis J02.9
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
## 2024-04-05 NOTE — DISCHARGE INSTRUCTIONS
Weakness: Care Instructions  Your Care Instructions     Weakness is a lack of physical or muscle strength. You may feel that you need to make extra effort to move your arms, legs, or other muscles. Generalized weakness means that you feel weak in most areas of your body. Another type of weakness may affect just one muscle or group of muscles.  You may feel weak and tired after you have done too much activity, such as taking an extra-long hike. This is not a serious problem. It often goes away on its own.  Feeling weak can also be caused by medical conditions like thyroid problems, depression, or a virus. Sometimes the cause can be serious. Your doctor may want to do more tests to try to find the cause of the weakness.  The doctor has checked you carefully, but problems can develop later. If you notice any problems or new symptoms, get medical treatment right away.  Follow-up care is a key part of your treatment and safety. Be sure to make and go to all appointments, and call your doctor if you are having problems. It's also a good idea to know your test results and keep a list of the medicines you take.  How can you care for yourself at home?  Rest when you feel tired.  Be safe with medicines. If your doctor prescribed medicine, take it exactly as prescribed. Call your doctor if you think you are having a problem with your medicine. You will get more details on the specific medicines your doctor prescribes.  Do not skip meals. Eating a balanced diet may increase your energy level.  Get some physical activity every day, but do not get too tired.  When should you call for help?   Call your doctor now or seek immediate medical care if:    You have new or worse weakness.     You are dizzy or lightheaded, or you feel like you may faint.   Watch closely for changes in your health, and be sure to contact your doctor if:    You do not get better as expected.   Where can you learn more?  Go to
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
## 2024-04-06 VITALS
WEIGHT: 222.88 LBS | BODY MASS INDEX: 34.98 KG/M2 | RESPIRATION RATE: 14 BRPM | SYSTOLIC BLOOD PRESSURE: 135 MMHG | HEIGHT: 67 IN | OXYGEN SATURATION: 93 % | TEMPERATURE: 98.2 F | HEART RATE: 57 BPM | DIASTOLIC BLOOD PRESSURE: 74 MMHG

## 2024-04-06 LAB
ANION GAP SERPL CALCULATED.3IONS-SCNC: 10 MMOL/L (ref 3–16)
BASOPHILS # BLD: 0 K/UL (ref 0–0.2)
BASOPHILS NFR BLD: 0.6 %
BUN SERPL-MCNC: 19 MG/DL (ref 7–20)
CALCIUM SERPL-MCNC: 9.3 MG/DL (ref 8.3–10.6)
CHLORIDE SERPL-SCNC: 107 MMOL/L (ref 99–110)
CO2 SERPL-SCNC: 23 MMOL/L (ref 21–32)
CREAT SERPL-MCNC: 0.9 MG/DL (ref 0.8–1.3)
DEPRECATED RDW RBC AUTO: 14 % (ref 12.4–15.4)
EOSINOPHIL # BLD: 0.4 K/UL (ref 0–0.6)
EOSINOPHIL NFR BLD: 5.7 %
GFR SERPLBLD CREATININE-BSD FMLA CKD-EPI: 83 ML/MIN/{1.73_M2}
GLUCOSE SERPL-MCNC: 108 MG/DL (ref 70–99)
HCT VFR BLD AUTO: 38.9 % (ref 40.5–52.5)
HGB BLD-MCNC: 13.3 G/DL (ref 13.5–17.5)
LYMPHOCYTES # BLD: 1.2 K/UL (ref 1–5.1)
LYMPHOCYTES NFR BLD: 14.7 %
MCH RBC QN AUTO: 31.6 PG (ref 26–34)
MCHC RBC AUTO-ENTMCNC: 34.1 G/DL (ref 31–36)
MCV RBC AUTO: 92.6 FL (ref 80–100)
MONOCYTES # BLD: 0.6 K/UL (ref 0–1.3)
MONOCYTES NFR BLD: 7.3 %
NEUTROPHILS # BLD: 5.6 K/UL (ref 1.7–7.7)
NEUTROPHILS NFR BLD: 71.7 %
PLATELET # BLD AUTO: 138 K/UL (ref 135–450)
PMV BLD AUTO: 8.8 FL (ref 5–10.5)
POTASSIUM SERPL-SCNC: 4.3 MMOL/L (ref 3.5–5.1)
RBC # BLD AUTO: 4.2 M/UL (ref 4.2–5.9)
SODIUM SERPL-SCNC: 140 MMOL/L (ref 136–145)
WBC # BLD AUTO: 7.9 K/UL (ref 4–11)

## 2024-04-06 PROCEDURE — 80048 BASIC METABOLIC PNL TOTAL CA: CPT

## 2024-04-06 PROCEDURE — 36415 COLL VENOUS BLD VENIPUNCTURE: CPT

## 2024-04-06 PROCEDURE — 85025 COMPLETE CBC W/AUTO DIFF WBC: CPT

## 2024-04-06 PROCEDURE — 2580000003 HC RX 258: Performed by: HOSPITALIST

## 2024-04-06 PROCEDURE — 6370000000 HC RX 637 (ALT 250 FOR IP): Performed by: INTERNAL MEDICINE

## 2024-04-06 PROCEDURE — 6370000000 HC RX 637 (ALT 250 FOR IP): Performed by: REGISTERED NURSE

## 2024-04-06 PROCEDURE — 94760 N-INVAS EAR/PLS OXIMETRY 1: CPT

## 2024-04-06 PROCEDURE — 6360000002 HC RX W HCPCS: Performed by: HOSPITALIST

## 2024-04-06 PROCEDURE — 6370000000 HC RX 637 (ALT 250 FOR IP): Performed by: HOSPITALIST

## 2024-04-06 RX ADMIN — ASPIRIN 81 MG: 81 TABLET, COATED ORAL at 08:13

## 2024-04-06 RX ADMIN — ENOXAPARIN SODIUM 30 MG: 100 INJECTION SUBCUTANEOUS at 08:13

## 2024-04-06 RX ADMIN — CARVEDILOL 6.25 MG: 6.25 TABLET, FILM COATED ORAL at 08:12

## 2024-04-06 RX ADMIN — SODIUM CHLORIDE, PRESERVATIVE FREE 10 ML: 5 INJECTION INTRAVENOUS at 08:14

## 2024-04-06 RX ADMIN — SERTRALINE 50 MG: 50 TABLET, FILM COATED ORAL at 08:13

## 2024-04-06 RX ADMIN — LOSARTAN POTASSIUM 50 MG: 25 TABLET, FILM COATED ORAL at 08:13

## 2024-04-06 RX ADMIN — METHOCARBAMOL 500 MG: 500 TABLET ORAL at 08:13

## 2024-04-06 RX ADMIN — POLYETHYLENE GLYCOL 3350 17 G: 17 POWDER, FOR SOLUTION ORAL at 08:27

## 2024-04-06 RX ADMIN — Medication 1 TABLET: at 08:13

## 2024-04-06 RX ADMIN — DORZOLAMIDE HYDROCHLORIDE AND TIMOLOL MALEATE 1 DROP: 20; 5 SOLUTION/ DROPS OPHTHALMIC at 08:14

## 2024-04-06 ASSESSMENT — PAIN SCALES - GENERAL
PAINLEVEL_OUTOF10: 0
PAINLEVEL_OUTOF10: 0
PAINLEVEL_OUTOF10: 6
PAINLEVEL_OUTOF10: 0

## 2024-04-06 ASSESSMENT — PAIN - FUNCTIONAL ASSESSMENT: PAIN_FUNCTIONAL_ASSESSMENT: PREVENTS OR INTERFERES SOME ACTIVE ACTIVITIES AND ADLS

## 2024-04-06 ASSESSMENT — PAIN DESCRIPTION - DESCRIPTORS: DESCRIPTORS: ACHING

## 2024-04-06 ASSESSMENT — PAIN DESCRIPTION - ORIENTATION: ORIENTATION: RIGHT

## 2024-04-06 ASSESSMENT — PAIN DESCRIPTION - LOCATION: LOCATION: RIB CAGE

## 2024-04-06 NOTE — PROGRESS NOTES
Pt up to chair, alarm on, legs elevated. A+Ox4, no c/o pain at this time. Calm and cooperative, No questions or needs at this time. Watching TV. Call light in reach, plan of care ongoing. Report called to Liam FRANKS at [facility]. No further questions at this time from SNF RN.

## 2024-04-06 NOTE — PROGRESS NOTES
Patient discharge to [facility], son Liam to transport to facility. /74, HR 57, Temp 98.2, Resp 17 SpO2 93 % on RA. No c/o pain. All belongings sent with pt and son. Discharge instructions given,with son at bedside (with consent), states understanding, no questions at this time. IV removed without complications. [facility] called and notified pt discharge and off tele monitor. Wheelchair transport to [facility]. Electronically signed by Jean Riggs RN on 4/6/2024 at 12:57 PM

## 2024-04-06 NOTE — CARE COORDINATION
.DISCHARGE SUMMARY     DATE OF DISCHARGE: 4/6/24    DISCHARGE DESTINATION: [facility]    FACILITY: Discharging to Facility/ Agency   Name: [facility]  Address:  [address] , [place] [address]   Phone:  [phone]  Fax:  [phone]    INSURANCE PRECERT OBTAINED: yes    [facility] COMPLETED: yes    TRANSPORTATION:   Johnny Wheeler will come  pt    Time: 12:00    COMMENTS: spoke with pt who is agreeable to SNF today.  Called johnny Wheeler who will be transporting and he will arrive about noon today.  RN aware.  Spoke with Yarelis at [facility] who is aware.  Electronically signed by THUAN Pedraza on 4/6/2024 at 10:12 AM

## 2024-04-06 NOTE — CARE COORDINATION
[facility] precert approved Dates 04/05/2024-04/09/2024    Plan is for pt to go to [facility].  Electronically signed by THUAN Pedraza on 4/6/2024 at 8:37 AM

## 2024-04-06 NOTE — PLAN OF CARE
Problem: Discharge Planning  Goal: Discharge to home or other facility with appropriate resources  4/4/2024 1134 by Narciso Leon RN  Outcome: Progressing  4/4/2024 0414 by Chiki Wyatt RN  Outcome: Progressing  Flowsheets (Taken 4/4/2024 0414)  Discharge to home or other facility with appropriate resources:   Identify barriers to discharge with patient and caregiver   Arrange for needed discharge resources and transportation as appropriate   Identify discharge learning needs (meds, wound care, etc)   Arrange for interpreters to assist at discharge as needed   Refer to discharge planning if patient needs post-hospital services based on physician order or complex needs related to functional status, cognitive ability or social support system     Problem: Skin/Tissue Integrity  Goal: Absence of new skin breakdown  Description: 1.  Monitor for areas of redness and/or skin breakdown  2.  Assess vascular access sites hourly  3.  Every 4-6 hours minimum:  Change oxygen saturation probe site  4.  Every 4-6 hours:  If on nasal continuous positive airway pressure, respiratory therapy assess nares and determine need for appliance change or resting period.  4/4/2024 1134 by Narciso Leon RN  Outcome: Progressing  4/4/2024 0414 by Chiki Wyatt RN  Outcome: Progressing     Problem: Safety - Adult  Goal: Free from fall injury  4/4/2024 1134 by Narciso Leon RN  Outcome: Progressing  4/4/2024 0414 by Chiki Wyatt RN  Outcome: Progressing     Problem: ABCDS Injury Assessment  Goal: Absence of physical injury  4/4/2024 1134 by Narciso Leon RN  Outcome: Progressing  4/4/2024 0414 by Chiki Wyatt RN  Outcome: Progressing     
  Problem: Discharge Planning  Goal: Discharge to home or other facility with appropriate resources  4/5/2024 0121 by Candida Rahman RN  Outcome: Progressing  Flowsheets (Taken 4/4/2024 2048)  Discharge to home or other facility with appropriate resources: Identify barriers to discharge with patient and caregiver  4/4/2024 1134 by Narciso Leon RN  Outcome: Progressing     Problem: Skin/Tissue Integrity  Goal: Absence of new skin breakdown  Description: 1.  Monitor for areas of redness and/or skin breakdown  2.  Assess vascular access sites hourly  3.  Every 4-6 hours minimum:  Change oxygen saturation probe site  4.  Every 4-6 hours:  If on nasal continuous positive airway pressure, respiratory therapy assess nares and determine need for appliance change or resting period.  4/5/2024 0121 by Candida Rahman RN  Outcome: Progressing  4/4/2024 1134 by Narciso Leon RN  Outcome: Progressing     Problem: Safety - Adult  Goal: Free from fall injury  4/5/2024 0121 by Candida Rahman RN  Outcome: Progressing  4/4/2024 1134 by Narciso Leon RN  Outcome: Progressing     Problem: ABCDS Injury Assessment  Goal: Absence of physical injury  4/5/2024 0121 by Candida Rahman RN  Outcome: Progressing  4/4/2024 1134 by Narciso Leon RN  Outcome: Progressing     Problem: Chronic Conditions and Co-morbidities  Goal: Patient's chronic conditions and co-morbidity symptoms are monitored and maintained or improved  Outcome: Progressing  Flowsheets (Taken 4/4/2024 2048)  Care Plan - Patient's Chronic Conditions and Co-Morbidity Symptoms are Monitored and Maintained or Improved: Monitor and assess patient's chronic conditions and comorbid symptoms for stability, deterioration, or improvement     Problem: Neurosensory - Adult  Goal: Achieves stable or improved neurological status  Outcome: Progressing     Problem: Respiratory - Adult  Goal: Achieves optimal ventilation and oxygenation  Outcome: Progressing   
  Problem: Discharge Planning  Goal: Discharge to home or other facility with appropriate resources  4/5/2024 2142 by Candida Rahman RN  Outcome: Progressing  Flowsheets (Taken 4/5/2024 2131)  Discharge to home or other facility with appropriate resources: Identify barriers to discharge with patient and caregiver  4/5/2024 1131 by Narciso Leon RN  Outcome: Progressing     Problem: Skin/Tissue Integrity  Goal: Absence of new skin breakdown  Description: 1.  Monitor for areas of redness and/or skin breakdown  2.  Assess vascular access sites hourly  3.  Every 4-6 hours minimum:  Change oxygen saturation probe site  4.  Every 4-6 hours:  If on nasal continuous positive airway pressure, respiratory therapy assess nares and determine need for appliance change or resting period.  4/5/2024 2142 by Candida Rahman RN  Outcome: Progressing  4/5/2024 1131 by Narciso Leon RN  Outcome: Progressing     Problem: Safety - Adult  Goal: Free from fall injury  4/5/2024 2142 by Candida Rahman RN  Outcome: Progressing  4/5/2024 1131 by Narciso Leon RN  Outcome: Progressing     Problem: ABCDS Injury Assessment  Goal: Absence of physical injury  4/5/2024 2142 by Candida Rahman RN  Outcome: Progressing  4/5/2024 1131 by Narciso Leon RN  Outcome: Progressing     Problem: Chronic Conditions and Co-morbidities  Goal: Patient's chronic conditions and co-morbidity symptoms are monitored and maintained or improved  4/5/2024 2142 by Candida Rahman RN  Outcome: Progressing  Flowsheets (Taken 4/5/2024 2131)  Care Plan - Patient's Chronic Conditions and Co-Morbidity Symptoms are Monitored and Maintained or Improved: Monitor and assess patient's chronic conditions and comorbid symptoms for stability, deterioration, or improvement  4/5/2024 1131 by Narciso Leon RN  Outcome: Progressing     Problem: Neurosensory - Adult  Goal: Achieves stable or improved neurological status  4/5/2024 2142 by Candida Rahman 
  Problem: Discharge Planning  Goal: Discharge to home or other facility with appropriate resources  4/6/2024 0901 by Jean Riggs RN  Outcome: Progressing  4/5/2024 2142 by Candida Rahman RN  Outcome: Progressing  Flowsheets (Taken 4/5/2024 2131)  Discharge to home or other facility with appropriate resources: Identify barriers to discharge with patient and caregiver     Problem: Skin/Tissue Integrity  Goal: Absence of new skin breakdown  Description: 1.  Monitor for areas of redness and/or skin breakdown  2.  Assess vascular access sites hourly  3.  Every 4-6 hours minimum:  Change oxygen saturation probe site  4.  Every 4-6 hours:  If on nasal continuous positive airway pressure, respiratory therapy assess nares and determine need for appliance change or resting period.  4/6/2024 0901 by Jean Riggs RN  Outcome: Progressing  4/5/2024 2142 by Candida Rahman RN  Outcome: Progressing     Problem: Safety - Adult  Goal: Free from fall injury  4/6/2024 0901 by Jean Riggs RN  Outcome: Progressing  4/5/2024 2142 by Candida Rahman RN  Outcome: Progressing     Problem: ABCDS Injury Assessment  Goal: Absence of physical injury  4/6/2024 0901 by Jean Riggs RN  Outcome: Progressing  4/5/2024 2142 by Candida Rahman RN  Outcome: Progressing     Problem: Chronic Conditions and Co-morbidities  Goal: Patient's chronic conditions and co-morbidity symptoms are monitored and maintained or improved  4/6/2024 0901 by Jean Riggs RN  Outcome: Progressing  4/5/2024 2142 by Candida Rahman RN  Outcome: Progressing  Flowsheets (Taken 4/5/2024 2131)  Care Plan - Patient's Chronic Conditions and Co-Morbidity Symptoms are Monitored and Maintained or Improved: Monitor and assess patient's chronic conditions and comorbid symptoms for stability, deterioration, or improvement     Problem: Neurosensory - Adult  Goal: Achieves stable or improved neurological status  4/6/2024 0901 by Jean Riggs 
  Problem: Discharge Planning  Goal: Discharge to home or other facility with appropriate resources  4/6/2024 1247 by Jean Riggs RN  Outcome: Completed  4/6/2024 0901 by Jean Riggs RN  Outcome: Progressing     Problem: Skin/Tissue Integrity  Goal: Absence of new skin breakdown  Description: 1.  Monitor for areas of redness and/or skin breakdown  2.  Assess vascular access sites hourly  3.  Every 4-6 hours minimum:  Change oxygen saturation probe site  4.  Every 4-6 hours:  If on nasal continuous positive airway pressure, respiratory therapy assess nares and determine need for appliance change or resting period.  4/6/2024 1247 by Jean Riggs RN  Outcome: Completed  4/6/2024 0901 by Jean Riggs RN  Outcome: Progressing     Problem: Safety - Adult  Goal: Free from fall injury  4/6/2024 1247 by Jean Riggs RN  Outcome: Completed  4/6/2024 0901 by Jean Riggs RN  Outcome: Progressing     Problem: ABCDS Injury Assessment  Goal: Absence of physical injury  4/6/2024 1247 by Jean Riggs RN  Outcome: Completed  4/6/2024 0901 by Jean Riggs RN  Outcome: Progressing     Problem: Chronic Conditions and Co-morbidities  Goal: Patient's chronic conditions and co-morbidity symptoms are monitored and maintained or improved  4/6/2024 1247 by Jean Riggs RN  Outcome: Completed  4/6/2024 0901 by Jean Riggs RN  Outcome: Progressing     Problem: Neurosensory - Adult  Goal: Achieves stable or improved neurological status  4/6/2024 1247 by Jean Riggs RN  Outcome: Completed  4/6/2024 0901 by Jean Riggs RN  Outcome: Progressing     Problem: Respiratory - Adult  Goal: Achieves optimal ventilation and oxygenation  4/6/2024 1247 by Jean Riggs RN  Outcome: Completed  4/6/2024 0901 by Jean Riggs RN  Outcome: Progressing     Problem: Cardiovascular - Adult  Goal: Maintains optimal cardiac output and hemodynamic stability  4/6/2024 1247 by Jean Riggs 
  Problem: Discharge Planning  Goal: Discharge to home or other facility with appropriate resources  Outcome: Progressing  Flowsheets (Taken 4/4/2024 5385)  Discharge to home or other facility with appropriate resources:   Identify barriers to discharge with patient and caregiver   Arrange for needed discharge resources and transportation as appropriate   Identify discharge learning needs (meds, wound care, etc)   Arrange for interpreters to assist at discharge as needed   Refer to discharge planning if patient needs post-hospital services based on physician order or complex needs related to functional status, cognitive ability or social support system     Problem: Skin/Tissue Integrity  Goal: Absence of new skin breakdown  Description: 1.  Monitor for areas of redness and/or skin breakdown  2.  Assess vascular access sites hourly  3.  Every 4-6 hours minimum:  Change oxygen saturation probe site  4.  Every 4-6 hours:  If on nasal continuous positive airway pressure, respiratory therapy assess nares and determine need for appliance change or resting period.  Outcome: Progressing     Problem: Safety - Adult  Goal: Free from fall injury  Outcome: Progressing     
RN  Outcome: Progressing  4/5/2024 0121 by Candida Rahman RN  Outcome: Progressing  Flowsheets (Taken 4/4/2024 2048)  Achieves stable or improved neurological status: Assess for and report changes in neurological status     Problem: Respiratory - Adult  Goal: Achieves optimal ventilation and oxygenation  4/5/2024 1131 by Narciso Leon RN  Outcome: Progressing  4/5/2024 0121 by Candida Rahman RN  Outcome: Progressing  Flowsheets (Taken 4/4/2024 2048)  Achieves optimal ventilation and oxygenation: Assess for changes in respiratory status     Problem: Cardiovascular - Adult  Goal: Maintains optimal cardiac output and hemodynamic stability  4/5/2024 1131 by Narciso Leon RN  Outcome: Progressing  4/5/2024 0121 by Candida Rahman RN  Outcome: Progressing  Flowsheets (Taken 4/4/2024 2048)  Maintains optimal cardiac output and hemodynamic stability: Monitor blood pressure and heart rate     Problem: Skin/Tissue Integrity - Adult  Goal: Skin integrity remains intact  4/5/2024 1131 by Narciso Leon RN  Outcome: Progressing  4/5/2024 0121 by Candida Rahman RN  Outcome: Progressing  Flowsheets (Taken 4/4/2024 2048)  Skin Integrity Remains Intact: Monitor for areas of redness and/or skin breakdown  Goal: Oral mucous membranes remain intact  4/5/2024 1131 by Narciso Leon RN  Outcome: Progressing  4/5/2024 0121 by Candida Rahman RN  Outcome: Progressing  Flowsheets (Taken 4/4/2024 2048)  Oral Mucous Membranes Remain Intact: Assess oral mucosa and hygiene practices     Problem: Musculoskeletal - Adult  Goal: Return mobility to safest level of function  4/5/2024 1131 by Narciso Leon RN  Outcome: Progressing  4/5/2024 0121 by Candida Rahman RN  Outcome: Progressing  Flowsheets (Taken 4/4/2024 2048)  Return Mobility to Safest Level of Function: Assess patient stability and activity tolerance for standing, transferring and ambulating with or without assistive devices  Goal: Return ADL status to
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
